# Patient Record
Sex: FEMALE | Race: WHITE | NOT HISPANIC OR LATINO | Employment: OTHER | URBAN - METROPOLITAN AREA
[De-identification: names, ages, dates, MRNs, and addresses within clinical notes are randomized per-mention and may not be internally consistent; named-entity substitution may affect disease eponyms.]

---

## 2021-10-21 LAB — NONINV COLON CA DNA+OCC BLD SCRN STL QL: NEGATIVE

## 2021-11-19 LAB — BCS RECOMMENDATION EXT: NORMAL

## 2021-11-28 ENCOUNTER — HISTORICAL (OUTPATIENT)
Dept: ADMINISTRATIVE | Facility: HOSPITAL | Age: 66
End: 2021-11-28

## 2021-11-28 LAB
INFLUENZA A ANTIGEN, POC: NEGATIVE
INFLUENZA B ANTIGEN, POC: NEGATIVE
SARS-COV-2 RNA RESP QL NAA+PROBE: NEGATIVE

## 2022-04-10 ENCOUNTER — HISTORICAL (OUTPATIENT)
Dept: ADMINISTRATIVE | Facility: HOSPITAL | Age: 67
End: 2022-04-10
Payer: MEDICARE

## 2022-04-25 VITALS
OXYGEN SATURATION: 99 % | HEIGHT: 63 IN | DIASTOLIC BLOOD PRESSURE: 60 MMHG | SYSTOLIC BLOOD PRESSURE: 80 MMHG | WEIGHT: 231.5 LBS | BODY MASS INDEX: 41.02 KG/M2

## 2022-06-20 ENCOUNTER — TELEPHONE (OUTPATIENT)
Dept: NEUROLOGY | Facility: CLINIC | Age: 67
End: 2022-06-20
Payer: MEDICARE

## 2022-06-20 NOTE — TELEPHONE ENCOUNTER
Patient called requesting a call back to further discuss if Dr. Napoles can prescribe her additional pills for Nurtec. States she only receives 8 pills a month and the prescription doesn't last at all. Reports she has headaches and need to know what to do when she is out of medication. Please advise.

## 2022-06-29 ENCOUNTER — OFFICE VISIT (OUTPATIENT)
Dept: INTERNAL MEDICINE | Facility: CLINIC | Age: 67
End: 2022-06-29
Payer: MEDICARE

## 2022-06-29 VITALS
DIASTOLIC BLOOD PRESSURE: 70 MMHG | OXYGEN SATURATION: 95 % | HEART RATE: 51 BPM | WEIGHT: 228 LBS | SYSTOLIC BLOOD PRESSURE: 126 MMHG | HEIGHT: 63 IN | BODY MASS INDEX: 40.4 KG/M2

## 2022-06-29 DIAGNOSIS — R10.9 CHRONIC ABDOMINAL PAIN: ICD-10-CM

## 2022-06-29 DIAGNOSIS — K21.9 GASTROESOPHAGEAL REFLUX DISEASE, UNSPECIFIED WHETHER ESOPHAGITIS PRESENT: ICD-10-CM

## 2022-06-29 DIAGNOSIS — N18.30 STAGE 3 CHRONIC KIDNEY DISEASE, UNSPECIFIED WHETHER STAGE 3A OR 3B CKD: ICD-10-CM

## 2022-06-29 DIAGNOSIS — I48.0 PAROXYSMAL ATRIAL FIBRILLATION: ICD-10-CM

## 2022-06-29 DIAGNOSIS — I50.9 CHRONIC CONGESTIVE HEART FAILURE, UNSPECIFIED HEART FAILURE TYPE: ICD-10-CM

## 2022-06-29 DIAGNOSIS — G89.29 CHRONIC ABDOMINAL PAIN: ICD-10-CM

## 2022-06-29 DIAGNOSIS — I10 ESSENTIAL (PRIMARY) HYPERTENSION: ICD-10-CM

## 2022-06-29 DIAGNOSIS — Z00.00 WELLNESS EXAMINATION: Primary | ICD-10-CM

## 2022-06-29 DIAGNOSIS — E78.5 HYPERLIPIDEMIA, UNSPECIFIED HYPERLIPIDEMIA TYPE: ICD-10-CM

## 2022-06-29 DIAGNOSIS — G43.909 MIGRAINE WITHOUT STATUS MIGRAINOSUS, NOT INTRACTABLE, UNSPECIFIED MIGRAINE TYPE: ICD-10-CM

## 2022-06-29 PROBLEM — N18.9 CHRONIC KIDNEY DISEASE: Status: ACTIVE | Noted: 2022-06-29

## 2022-06-29 PROCEDURE — 99204 PR OFFICE/OUTPT VISIT, NEW, LEVL IV, 45-59 MIN: ICD-10-PCS | Mod: ,,, | Performed by: STUDENT IN AN ORGANIZED HEALTH CARE EDUCATION/TRAINING PROGRAM

## 2022-06-29 PROCEDURE — 99204 OFFICE O/P NEW MOD 45 MIN: CPT | Mod: ,,, | Performed by: STUDENT IN AN ORGANIZED HEALTH CARE EDUCATION/TRAINING PROGRAM

## 2022-06-29 RX ORDER — LISINOPRIL 10 MG/1
10 TABLET ORAL DAILY
COMMUNITY
Start: 2022-05-30 | End: 2022-08-23

## 2022-06-29 RX ORDER — FUROSEMIDE 20 MG/1
20 TABLET ORAL DAILY PRN
COMMUNITY

## 2022-06-29 RX ORDER — FLUTICASONE PROPIONATE 50 MCG
SPRAY, SUSPENSION (ML) NASAL
COMMUNITY
Start: 2022-06-17

## 2022-06-29 RX ORDER — DOFETILIDE 0.12 MG/1
125 CAPSULE ORAL
COMMUNITY
Start: 2021-11-28

## 2022-06-29 RX ORDER — ERGOCALCIFEROL (VITAMIN D2) 200 MCG/ML
DROPS ORAL
COMMUNITY

## 2022-06-29 RX ORDER — DEXLANSOPRAZOLE 60 MG/1
60 CAPSULE, DELAYED RELEASE ORAL
COMMUNITY

## 2022-06-29 RX ORDER — PREGABALIN 150 MG/1
150 CAPSULE ORAL 2 TIMES DAILY
COMMUNITY
End: 2023-01-18 | Stop reason: SDUPTHER

## 2022-06-29 RX ORDER — POLYETHYLENE GLYCOL 3350 17 G/17G
17 POWDER, FOR SOLUTION ORAL DAILY PRN
COMMUNITY

## 2022-06-29 RX ORDER — CYCLOSPORINE 0.5 MG/ML
EMULSION OPHTHALMIC
COMMUNITY
Start: 2021-12-22

## 2022-06-29 RX ORDER — BISOPROLOL FUMARATE 5 MG/1
5 TABLET, FILM COATED ORAL DAILY
COMMUNITY
Start: 2022-06-07 | End: 2022-11-22 | Stop reason: SDUPTHER

## 2022-06-29 RX ORDER — CALCIUM CARBONATE 200(500)MG
1 TABLET,CHEWABLE ORAL DAILY PRN
COMMUNITY

## 2022-06-29 RX ORDER — TRAMADOL HYDROCHLORIDE 50 MG/1
50 TABLET ORAL 3 TIMES DAILY PRN
COMMUNITY
Start: 2022-06-14

## 2022-06-29 RX ORDER — FAMOTIDINE 40 MG/1
40 TABLET, FILM COATED ORAL DAILY
COMMUNITY
Start: 2022-06-23

## 2022-06-29 RX ORDER — DULOXETIN HYDROCHLORIDE 20 MG/1
20 CAPSULE, DELAYED RELEASE ORAL
COMMUNITY
Start: 2021-11-28

## 2022-06-29 RX ORDER — EZETIMIBE 10 MG/1
10 TABLET ORAL
COMMUNITY
Start: 2022-05-07 | End: 2022-08-08 | Stop reason: SDUPTHER

## 2022-06-29 RX ORDER — TOPIRAMATE 50 MG/1
TABLET, COATED ORAL
COMMUNITY
Start: 2022-06-22

## 2022-06-29 RX ORDER — APIXABAN 5 MG/1
5 TABLET, FILM COATED ORAL 2 TIMES DAILY
COMMUNITY
Start: 2022-06-08

## 2022-06-29 RX ORDER — DOXEPIN HYDROCHLORIDE 75 MG/1
75 CAPSULE ORAL
COMMUNITY

## 2022-06-29 NOTE — ASSESSMENT & PLAN NOTE
On ezetimibe 10 mg QD  Has had reaction to statin in the past  Will have labs with Cardiology, will send labs

## 2022-06-29 NOTE — ASSESSMENT & PLAN NOTE
Rate controlled, on Eliquis for anticoagulation  Has had pacemaker placed  Follows with Cardiology  Continue current medications

## 2022-06-29 NOTE — ASSESSMENT & PLAN NOTE
Mammogram: done at breast center [facility]  DXA scan: done at breast center  Colonoscopy: done with Dr Jernigan  Pneumonia vaccine: received with previous PCP

## 2022-06-29 NOTE — PROGRESS NOTES
Subjective:      Annalise Castelan  06/29/2022  [id number]    Lenora Womack MD  Patient Care Team:  Lenora Ambriz MD as PCP - General (Internal Medicine)          Visit Type:a scheduled routine follow-up visit    Chief Complaint: Establish Care    HPI   Ms Woody is a 65 y/o female patient who is here to establish care. Patient has past medical history significant for atrial fibrillation, hypertension, migraine headaches, GERD and gastroparesis. Patient is complaining today of chronic abdominal pain associated with gastroparesis. No other complaints.       Past Medical History:   Diagnosis Date    Diabetes mellitus type I     GERD (gastroesophageal reflux disease)     Hypothyroidism      Past Surgical History:   Procedure Laterality Date    2 c-sections      2 heart ablation      APPENDECTOMY      CHOLECYSTECTOMY      HYSTERECTOMY      right shoulder repair       No family history on file.  Social History     Tobacco Use    Smoking status: Never Smoker    Smokeless tobacco: Never Used   Substance and Sexual Activity    Alcohol use: Yes    Drug use: Never    Sexual activity: Not on file     Active Problem List with Overview Notes    Diagnosis Date Noted    Chronic kidney disease 06/29/2022    Gastroesophageal reflux disease 06/29/2022    Migraine 06/29/2022    Wellness examination 06/29/2022    Essential (primary) hypertension 01/17/2022    Hyperlipidemia 01/17/2022    Chronic congestive heart failure 08/02/2021    Chronic abdominal pain 08/02/2021    Paroxysmal atrial fibrillation 11/25/2018     Review of patient's allergies indicates:   Allergen Reactions    Nsaids (non-steroidal anti-inflammatory drug) Other (See Comments)     HEADACHES  HEADACHE    HEADACHE      Oxycodone hcl-oxycodone-asa Other (See Comments)     hallucinations    Pantoprazole Nausea Only and Other (See Comments)     HEADACHES  HEADACHES      Pseudoephedrine Nausea Only     Other reaction(s): Headache       Statins-hmg-coa reductase inhibitors Other (See Comments)     HEADACHE  HEADACHE    Other reaction(s): Headache       The following were reviewed at this visit: active problem list, medication list, allergies, family history, social history, and health maintenance.    Medications:    Current Outpatient Medications:     aluminum hydrox-magnesium carb  mg/15 mL Susp, Take 10 mLs by mouth every 6 (six) hours as needed., Disp: , Rfl:     bisoprolol (ZEBETA) 5 MG tablet, Take 5 mg by mouth once daily., Disp: , Rfl:     calcium carbonate (TUMS) 200 mg calcium (500 mg) chewable tablet, Take 1 tablet by mouth daily as needed., Disp: , Rfl:     cycloSPORINE (RESTASIS) 0.05 % ophthalmic emulsion, , Disp: , Rfl:     dexlansoprazole (DEXILANT) 60 mg capsule, Take 60 mg by mouth., Disp: , Rfl:     dofetilide (TIKOSYN) 125 MCG Cap, Take 125 mcg by mouth., Disp: , Rfl:     doxepin (SINEQUAN) 75 MG capsule, Take 75 mg by mouth., Disp: , Rfl:     DULoxetine (CYMBALTA) 20 MG capsule, Take 20 mg by mouth., Disp: , Rfl:     ELIQUIS 5 mg Tab, Take 5 mg by mouth 2 (two) times daily., Disp: , Rfl:     ergocalciferol 8000 units/mL Drop liquid (PEDS), Take by mouth., Disp: , Rfl:     ezetimibe (ZETIA) 10 mg tablet, Take 10 mg by mouth 3 (three) times a week., Disp: , Rfl:     famotidine (PEPCID) 40 MG tablet, Take 40 mg by mouth once daily., Disp: , Rfl:     fluticasone propionate (FLONASE) 50 mcg/actuation nasal spray, SMARTSI Both Nares Daily PRN, Disp: , Rfl:     furosemide (LASIX) 20 MG tablet, Take 20 mg by mouth daily as needed., Disp: , Rfl:     isomethepten-caf-acetaminophen 65- mg Tab, Take 1 tablet by mouth daily as needed., Disp: , Rfl:     lisinopriL 10 MG tablet, Take 10 mg by mouth once daily., Disp: , Rfl:     polyethylene glycol (GLYCOLAX) 17 gram/dose powder, Take 17 g by mouth daily as needed., Disp: , Rfl:     pregabalin (LYRICA) 150 MG capsule, Take 150 mg by mouth 2 (two) times a  "day., Disp: , Rfl:     rimegepant 75 mg odt, Take 1 tablet (75 mg total) by mouth every other day. Place ODT tablet on the tongue; alternatively the ODT tablet may be placed under the tongue, Disp: 15 tablet, Rfl: 2    TOPAMAX 50 mg tablet, Take by mouth., Disp: , Rfl:     traMADoL (ULTRAM) 50 mg tablet, Take 50 mg by mouth 4 (four) times daily as needed., Disp: , Rfl:       Medications have been reviewed and reconciled with patient at this visit.  Barriers to medications reviewed with patient.    Adverse reactions to current medications reviewed with patient..    Over the counter medications reviewed and reconciled with patient.  ROS        Objective:      Vitals:    06/29/22 1130   BP: 126/70   BP Location: Right arm   Pulse: (!) 51   SpO2: 95%   Weight: 103.4 kg (228 lb)   Height: 5' 3" (1.6 m)       Physical Exam      Laboratory Reviewed ({Yes)  No results found for: WBC, HGB, HCT, PLT, CHOL, TRIG, HDL, LDLDIRECT, ALT, AST, NA, K, CL, CREATININE, BUN, CO2, TSH, PSA, INR, GLUF, HGBA1C, MICROALBUR      Assessment and Plan:       Problem List Items Addressed This Visit        Neuro    Migraine     Follows with Neurology  Continue current medications               Cardiac/Vascular    Chronic congestive heart failure     Follows with Cardiology  Continue current medications              Essential (primary) hypertension     Controlled  Continue current medications           Hyperlipidemia     On ezetimibe 10 mg QD  Has had reaction to statin in the past  Will have labs with Cardiology, will send labs              Paroxysmal atrial fibrillation     Rate controlled, on Eliquis for anticoagulation  Has had pacemaker placed  Follows with Cardiology  Continue current medications                Renal/    Chronic kidney disease     Follows with Nephrology  Will send lab results from Cardiology office              GI    Gastroesophageal reflux disease     On dexlansoprazole  Continue current medication           Chronic " abdominal pain     Follows with GI  Possibly associated to gastroparesis                Other    Wellness examination - Primary     Mammogram: done at breast [facility]  DXA scan: done at breast [place]  Colonoscopy: done with Dr Jernigan  Pneumonia vaccine: received with previous PCP                   Care Plan/Goals: Reviewed    Goals    None         Follow up: Follow up in about 6 months (around 12/29/2022) for wellness.    No orders of the defined types were placed in this encounter.

## 2022-08-08 DIAGNOSIS — E78.5 HYPERLIPIDEMIA, UNSPECIFIED HYPERLIPIDEMIA TYPE: Primary | ICD-10-CM

## 2022-08-08 RX ORDER — EZETIMIBE 10 MG/1
10 TABLET ORAL DAILY
Qty: 90 TABLET | Refills: 3 | Status: SHIPPED | OUTPATIENT
Start: 2022-08-08 | End: 2023-07-17

## 2022-09-01 ENCOUNTER — TELEPHONE (OUTPATIENT)
Dept: INTERNAL MEDICINE | Facility: CLINIC | Age: 67
End: 2022-09-01
Payer: MEDICARE

## 2022-09-01 NOTE — TELEPHONE ENCOUNTER
Patient is stating her blood pressure is low 100/60 and is wondering if she should take  her blood pressure pill or not asking for you to contact her.

## 2022-09-13 ENCOUNTER — TELEPHONE (OUTPATIENT)
Dept: INTERNAL MEDICINE | Facility: CLINIC | Age: 67
End: 2022-09-13
Payer: MEDICARE

## 2022-09-13 NOTE — TELEPHONE ENCOUNTER
Patient called with concerns of elevated blood pressure, I spoke to Dr. Atkinson and notified patient if blood pressure went above 160 to go to a walk in clinic or ER until she comes in to appt. To be evaluated.

## 2022-09-28 ENCOUNTER — OFFICE VISIT (OUTPATIENT)
Dept: INTERNAL MEDICINE | Facility: CLINIC | Age: 67
End: 2022-09-28
Payer: MEDICARE

## 2022-09-28 VITALS
BODY MASS INDEX: 40.04 KG/M2 | RESPIRATION RATE: 18 BRPM | DIASTOLIC BLOOD PRESSURE: 70 MMHG | WEIGHT: 226 LBS | HEART RATE: 54 BPM | HEIGHT: 63 IN | OXYGEN SATURATION: 96 % | SYSTOLIC BLOOD PRESSURE: 128 MMHG

## 2022-09-28 DIAGNOSIS — I10 ESSENTIAL (PRIMARY) HYPERTENSION: ICD-10-CM

## 2022-09-28 PROCEDURE — 99214 OFFICE O/P EST MOD 30 MIN: CPT | Mod: ,,, | Performed by: STUDENT IN AN ORGANIZED HEALTH CARE EDUCATION/TRAINING PROGRAM

## 2022-09-28 PROCEDURE — G0008 ADMIN INFLUENZA VIRUS VAC: HCPCS | Mod: ,,, | Performed by: STUDENT IN AN ORGANIZED HEALTH CARE EDUCATION/TRAINING PROGRAM

## 2022-09-28 PROCEDURE — 90694 VACC AIIV4 NO PRSRV 0.5ML IM: CPT | Mod: ,,, | Performed by: STUDENT IN AN ORGANIZED HEALTH CARE EDUCATION/TRAINING PROGRAM

## 2022-09-28 PROCEDURE — G0008 FLU VACCINE - QUADRIVALENT - ADJUVANTED: ICD-10-PCS | Mod: ,,, | Performed by: STUDENT IN AN ORGANIZED HEALTH CARE EDUCATION/TRAINING PROGRAM

## 2022-09-28 PROCEDURE — 90694 FLU VACCINE - QUADRIVALENT - ADJUVANTED: ICD-10-PCS | Mod: ,,, | Performed by: STUDENT IN AN ORGANIZED HEALTH CARE EDUCATION/TRAINING PROGRAM

## 2022-09-28 PROCEDURE — 99214 PR OFFICE/OUTPT VISIT, EST, LEVL IV, 30-39 MIN: ICD-10-PCS | Mod: ,,, | Performed by: STUDENT IN AN ORGANIZED HEALTH CARE EDUCATION/TRAINING PROGRAM

## 2022-09-28 RX ORDER — ATOGEPANT 30 MG/1
1 TABLET ORAL DAILY PRN
COMMUNITY
Start: 2022-09-26

## 2022-09-28 NOTE — PROGRESS NOTES
Subjective:      Annalise Castelan  2022  [id number]      Chief Complaint: Follow-up and Hypertension    Follow-up  Pertinent negatives include no chest pain, chills, congestion, coughing, fever, headaches, myalgias, nausea, rash, sore throat, vomiting or weakness.   Hypertension  Pertinent negatives include no chest pain, headaches, malaise/fatigue, palpitations or shortness of breath.   Ms Woody presents due to noticing high blood pressure at home. Patient brings blood pressure log which shows BP of 140s systolic and 80s diastolic. Patient states she takes her medications daily. Blood pressure here in office taken several times is 120/80. No other complaints.     Past Medical History:   Diagnosis Date    Diabetes mellitus type I     GERD (gastroesophageal reflux disease)     Hypothyroidism      Past Surgical History:   Procedure Laterality Date    2 c-sections      2 heart ablation      APPENDECTOMY       SECTION   &     CHOLECYSTECTOMY      HYSTERECTOMY      right shoulder repair      TUBAL LIGATION       Family History   Problem Relation Age of Onset    Heart disease Father     Hypertension Father     Stroke Father     Hyperlipidemia Sister     Kidney disease Brother      Social History     Tobacco Use    Smoking status: Never    Smokeless tobacco: Never   Substance and Sexual Activity    Alcohol use: Yes    Drug use: Never    Sexual activity: Not Currently     Partners: Male     Birth control/protection: Abstinence     Review of patient's allergies indicates:   Allergen Reactions    Nsaids (non-steroidal anti-inflammatory drug) Other (See Comments)     HEADACHES  HEADACHE    HEADACHE      Oxycodone hcl-oxycodone-asa Other (See Comments)     hallucinations    Pantoprazole Nausea Only and Other (See Comments)     HEADACHES  HEADACHES      Pseudoephedrine Nausea Only     Other reaction(s): Headache      Statins-hmg-coa reductase inhibitors Other (See Comments)     HEADACHE  HEADACHE    Other  reaction(s): Headache       The following were reviewed at this visit: active problem list, medication list, allergies, family history, social history, and health maintenance.    Medications:    Current Outpatient Medications:     aluminum hydrox-magnesium carb  mg/15 mL Susp, Take 10 mLs by mouth every 6 (six) hours as needed., Disp: , Rfl:     bisoprolol (ZEBETA) 5 MG tablet, Take 5 mg by mouth once daily., Disp: , Rfl:     calcium carbonate (TUMS) 200 mg calcium (500 mg) chewable tablet, Take 1 tablet by mouth daily as needed., Disp: , Rfl:     cycloSPORINE (RESTASIS) 0.05 % ophthalmic emulsion, , Disp: , Rfl:     dexlansoprazole (DEXILANT) 60 mg capsule, Take 60 mg by mouth., Disp: , Rfl:     dofetilide (TIKOSYN) 125 MCG Cap, Take 125 mcg by mouth., Disp: , Rfl:     doxepin (SINEQUAN) 75 MG capsule, Take 75 mg by mouth., Disp: , Rfl:     DULoxetine (CYMBALTA) 20 MG capsule, Take 20 mg by mouth., Disp: , Rfl:     ELIQUIS 5 mg Tab, Take 5 mg by mouth 2 (two) times daily., Disp: , Rfl:     ergocalciferol 8000 units/mL Drop liquid (PEDS), Take by mouth., Disp: , Rfl:     ezetimibe (ZETIA) 10 mg tablet, Take 1 tablet (10 mg total) by mouth once daily., Disp: 90 tablet, Rfl: 3    famotidine (PEPCID) 40 MG tablet, Take 40 mg by mouth once daily., Disp: , Rfl:     fluticasone propionate (FLONASE) 50 mcg/actuation nasal spray, SMARTSI Both Nares Daily PRN, Disp: , Rfl:     furosemide (LASIX) 20 MG tablet, Take 20 mg by mouth daily as needed., Disp: , Rfl:     isomethepten-caf-acetaminophen 65- mg Tab, Take 1 tablet by mouth daily as needed., Disp: , Rfl:     lisinopriL 10 MG tablet, TAKE 1 TABLET BY MOUTH EVERY DAY, Disp: 90 tablet, Rfl: 1    polyethylene glycol (GLYCOLAX) 17 gram/dose powder, Take 17 g by mouth daily as needed., Disp: , Rfl:     pregabalin (LYRICA) 150 MG capsule, Take 150 mg by mouth 2 (two) times a day., Disp: , Rfl:     QULIPTA 30 mg Tab, Take 1 tablet by mouth daily as needed., Disp:  ", Rfl:     TOPAMAX 50 mg tablet, Take by mouth., Disp: , Rfl:     traMADoL (ULTRAM) 50 mg tablet, Take 50 mg by mouth 4 (four) times daily as needed., Disp: , Rfl:       Medications have been reviewed and reconciled with patient at this visit.  Barriers to medications reviewed with patient.    Adverse reactions to current medications reviewed with patient..    Over the counter medications reviewed and reconciled with patient.  Review of Systems   Constitutional:  Negative for chills, fever, malaise/fatigue and weight loss.   HENT:  Negative for congestion, ear discharge, ear pain, hearing loss, sinus pain and sore throat.    Eyes:  Negative for photophobia, pain, discharge and redness.   Respiratory:  Negative for cough, shortness of breath and wheezing.    Cardiovascular:  Negative for chest pain, palpitations and leg swelling.   Gastrointestinal:  Negative for constipation, diarrhea, heartburn, nausea and vomiting.   Genitourinary:  Negative for dysuria, frequency and urgency.   Musculoskeletal:  Negative for falls, joint pain and myalgias.   Skin:  Negative for itching and rash.   Neurological:  Negative for dizziness, focal weakness, weakness and headaches.   Psychiatric/Behavioral:  Negative for depression and memory loss. The patient is not nervous/anxious and does not have insomnia.          Objective:      Vitals:    09/28/22 0907   BP: 128/70   BP Location: Right arm   Pulse: (!) 54   Resp: 18   SpO2: 96%   Weight: 102.5 kg (226 lb)   Height: 5' 3" (1.6 m)       Physical Exam  Constitutional:       General: She is not in acute distress.     Appearance: Normal appearance.   HENT:      Head: Normocephalic and atraumatic.   Eyes:      Extraocular Movements: Extraocular movements intact.      Pupils: Pupils are equal, round, and reactive to light.   Cardiovascular:      Rate and Rhythm: Normal rate and regular rhythm.      Pulses: Normal pulses.      Heart sounds: Normal heart sounds. No murmur heard.    No " friction rub. No gallop.   Pulmonary:      Effort: Pulmonary effort is normal.      Breath sounds: Normal breath sounds. No wheezing, rhonchi or rales.   Abdominal:      General: Abdomen is flat. Bowel sounds are normal. There is no distension.      Palpations: Abdomen is soft.      Tenderness: There is no abdominal tenderness.   Musculoskeletal:         General: No swelling or tenderness. Normal range of motion.      Cervical back: Normal range of motion and neck supple. No tenderness.      Right lower leg: No edema.      Left lower leg: No edema.   Lymphadenopathy:      Cervical: No cervical adenopathy.   Skin:     Findings: No lesion or rash.   Neurological:      General: No focal deficit present.      Mental Status: She is alert and oriented to person, place, and time.      Cranial Nerves: No cranial nerve deficit.      Sensory: No sensory deficit.      Motor: No weakness.   Psychiatric:         Mood and Affect: Mood normal.         Behavior: Behavior normal.         Thought Content: Thought content normal.             Assessment and Plan:       1. Essential (primary) hypertension  Assessment & Plan:  Patient has obtained elevated readings at home however today it is controlled, 120/80, taken several times  Will continue current medications for now  Continue to monitor BP at home and report high BP readings  Follow up as scheduled in January       Other orders  -     Influenza (FLUAD) - Quadrivalent (Adjuvanted) *Preferred* (65+) (PF)          Follow up: Follow up as scheduled

## 2022-09-28 NOTE — ASSESSMENT & PLAN NOTE
Patient has obtained elevated readings at home however today it is controlled, 120/80, taken several times  Will continue current medications for now  Continue to monitor BP at home and report high BP readings  Follow up as scheduled in January

## 2022-10-03 ENCOUNTER — PATIENT MESSAGE (OUTPATIENT)
Dept: NEUROLOGY | Facility: CLINIC | Age: 67
End: 2022-10-03
Payer: MEDICARE

## 2022-10-03 ENCOUNTER — TELEPHONE (OUTPATIENT)
Dept: NEUROLOGY | Facility: CLINIC | Age: 67
End: 2022-10-03
Payer: MEDICARE

## 2022-10-03 ENCOUNTER — DOCUMENTATION ONLY (OUTPATIENT)
Dept: ADMINISTRATIVE | Facility: HOSPITAL | Age: 67
End: 2022-10-03
Payer: MEDICARE

## 2022-10-03 PROBLEM — Z00.00 WELLNESS EXAMINATION: Status: RESOLVED | Noted: 2022-06-29 | Resolved: 2022-10-03

## 2022-10-03 NOTE — TELEPHONE ENCOUNTER
Patient wants to know if you can fill her prescription for Caffeine Prochlor IND (CPI) 50/10/50 mg Capsule. Please advise [phone]

## 2022-10-03 NOTE — TELEPHONE ENCOUNTER
Wants to know if you can fill this medication for her. Caffeine Prochlor INDO (CPI) 50/10/50 mg. It is used for Migraines. If so want is sent to [facility]. Please advise [phone]

## 2022-10-05 ENCOUNTER — DOCUMENTATION ONLY (OUTPATIENT)
Dept: INTERNAL MEDICINE | Facility: CLINIC | Age: 67
End: 2022-10-05
Payer: MEDICARE

## 2022-10-05 DIAGNOSIS — G43.909 MIGRAINE WITHOUT STATUS MIGRAINOSUS, NOT INTRACTABLE, UNSPECIFIED MIGRAINE TYPE: Primary | ICD-10-CM

## 2022-11-22 DIAGNOSIS — I10 ESSENTIAL (PRIMARY) HYPERTENSION: Primary | ICD-10-CM

## 2022-11-22 RX ORDER — BISOPROLOL FUMARATE 5 MG/1
5 TABLET, FILM COATED ORAL DAILY
Qty: 90 TABLET | Refills: 3 | Status: SHIPPED | OUTPATIENT
Start: 2022-11-22 | End: 2023-10-19

## 2022-12-06 ENCOUNTER — TELEPHONE (OUTPATIENT)
Dept: INTERNAL MEDICINE | Facility: CLINIC | Age: 67
End: 2022-12-06
Payer: MEDICARE

## 2022-12-06 DIAGNOSIS — Z12.31 SCREENING MAMMOGRAM FOR BREAST CANCER: Primary | ICD-10-CM

## 2023-01-09 ENCOUNTER — OFFICE VISIT (OUTPATIENT)
Dept: INTERNAL MEDICINE | Facility: CLINIC | Age: 68
End: 2023-01-09
Payer: MEDICARE

## 2023-01-09 VITALS
TEMPERATURE: 98 F | RESPIRATION RATE: 16 BRPM | DIASTOLIC BLOOD PRESSURE: 74 MMHG | WEIGHT: 224 LBS | OXYGEN SATURATION: 96 % | HEART RATE: 72 BPM | BODY MASS INDEX: 39.69 KG/M2 | SYSTOLIC BLOOD PRESSURE: 132 MMHG | HEIGHT: 63 IN

## 2023-01-09 DIAGNOSIS — G43.909 MIGRAINE WITHOUT STATUS MIGRAINOSUS, NOT INTRACTABLE, UNSPECIFIED MIGRAINE TYPE: ICD-10-CM

## 2023-01-09 DIAGNOSIS — N18.30 STAGE 3 CHRONIC KIDNEY DISEASE, UNSPECIFIED WHETHER STAGE 3A OR 3B CKD: ICD-10-CM

## 2023-01-09 DIAGNOSIS — I10 ESSENTIAL (PRIMARY) HYPERTENSION: ICD-10-CM

## 2023-01-09 DIAGNOSIS — I48.0 PAROXYSMAL ATRIAL FIBRILLATION: ICD-10-CM

## 2023-01-09 DIAGNOSIS — E78.5 HYPERLIPIDEMIA, UNSPECIFIED HYPERLIPIDEMIA TYPE: ICD-10-CM

## 2023-01-09 DIAGNOSIS — Z00.00 MEDICARE ANNUAL WELLNESS VISIT, SUBSEQUENT: Primary | ICD-10-CM

## 2023-01-09 DIAGNOSIS — I50.9 CHRONIC CONGESTIVE HEART FAILURE, UNSPECIFIED HEART FAILURE TYPE: ICD-10-CM

## 2023-01-09 PROCEDURE — G0439 PR MEDICARE ANNUAL WELLNESS SUBSEQUENT VISIT: ICD-10-PCS | Mod: ,,, | Performed by: STUDENT IN AN ORGANIZED HEALTH CARE EDUCATION/TRAINING PROGRAM

## 2023-01-09 PROCEDURE — G0439 PPPS, SUBSEQ VISIT: HCPCS | Mod: ,,, | Performed by: STUDENT IN AN ORGANIZED HEALTH CARE EDUCATION/TRAINING PROGRAM

## 2023-01-09 NOTE — PROGRESS NOTES
Subjective:      Annalise Castelan  2023  [id number]    Lenora Womack MD  Patient Care Team:  Lenora Ambriz MD as PCP - General (Internal Medicine)          Visit Type:a scheduled routine follow-up visit    Chief Complaint: Medicare AW Follow Up, Gastroesophageal Reflux, Atrial Fibrillation, Chronic Kidney Disease, Hypertension, Hyperlipidemia, Migraine, and Congestive Heart Failure    Gastroesophageal Reflux  She reports no chest pain, no coughing, no heartburn, no nausea, no sore throat or no wheezing. Pertinent negatives include no weight loss.   Atrial Fibrillation  Symptoms are negative for chest pain, dizziness, palpitations, shortness of breath and weakness. Past medical history includes atrial fibrillation, CHF and hyperlipidemia.   Hypertension  Pertinent negatives include no chest pain, headaches, malaise/fatigue, palpitations or shortness of breath.   Hyperlipidemia  Pertinent negatives include no chest pain, focal weakness, myalgias or shortness of breath.   Migraine   Pertinent negatives include no coughing, dizziness, ear pain, eye pain, eye redness, fever, hearing loss, insomnia, nausea, photophobia, sore throat, vomiting, weakness or weight loss.   Congestive Heart Failure  Pertinent negatives include no chest pain, palpitations or shortness of breath.       MS Woody presents for annual medicare wellness. Only complaint today is R knee pain, she is seeing Orthopedics and is awaiting a MRI. No other complaints.       Past Medical History:   Diagnosis Date    Diabetes mellitus type I     GERD (gastroesophageal reflux disease)     Hypothyroidism      Past Surgical History:   Procedure Laterality Date    2 c-sections      2 heart ablation      APPENDECTOMY       SECTION   &     CHOLECYSTECTOMY      HYSTERECTOMY      right shoulder repair      TUBAL LIGATION       Family History   Problem Relation Age of Onset    Heart disease Father     Hypertension Father      Stroke Father     Hyperlipidemia Sister     Kidney disease Brother      Social History     Tobacco Use    Smoking status: Never    Smokeless tobacco: Never   Substance and Sexual Activity    Alcohol use: Yes    Drug use: Never    Sexual activity: Not Currently     Partners: Male     Birth control/protection: Abstinence     Active Problem List with Overview Notes    Diagnosis Date Noted    Chronic kidney disease 06/29/2022    Gastroesophageal reflux disease 06/29/2022    Migraine 06/29/2022    Medicare annual wellness visit, subsequent 06/29/2022    Essential (primary) hypertension 01/17/2022    Hyperlipidemia 01/17/2022    Chronic congestive heart failure 08/02/2021    Chronic abdominal pain 08/02/2021    Paroxysmal atrial fibrillation 11/25/2018     S/p pacemaker placement        Review of patient's allergies indicates:   Allergen Reactions    Nsaids (non-steroidal anti-inflammatory drug) Other (See Comments)     HEADACHES  HEADACHE    HEADACHE      Oxycodone hcl-oxycodone-asa Other (See Comments)     hallucinations    Pantoprazole Nausea Only and Other (See Comments)     HEADACHES  HEADACHES      Pseudoephedrine Nausea Only     Other reaction(s): Headache      Statins-hmg-coa reductase inhibitors Other (See Comments)     HEADACHE  HEADACHE    Other reaction(s): Headache       The following were reviewed at this visit: active problem list, medication list, allergies, family history, social history, and health maintenance.    Medications:    Current Outpatient Medications:     bisoprolol (ZEBETA) 5 MG tablet, Take 1 tablet (5 mg total) by mouth once daily., Disp: 90 tablet, Rfl: 3    calcium carbonate (TUMS) 200 mg calcium (500 mg) chewable tablet, Take 1 tablet by mouth daily as needed., Disp: , Rfl:     cycloSPORINE (RESTASIS) 0.05 % ophthalmic emulsion, , Disp: , Rfl:     dexlansoprazole (DEXILANT) 60 mg capsule, Take 60 mg by mouth., Disp: , Rfl:     dofetilide (TIKOSYN) 125 MCG Cap, Take 125 mcg by mouth.,  Disp: , Rfl:     doxepin (SINEQUAN) 75 MG capsule, Take 75 mg by mouth., Disp: , Rfl:     DULoxetine (CYMBALTA) 20 MG capsule, Take 20 mg by mouth., Disp: , Rfl:     ELIQUIS 5 mg Tab, Take 5 mg by mouth 2 (two) times daily., Disp: , Rfl:     ergocalciferol 8000 units/mL Drop liquid (PEDS), Take by mouth., Disp: , Rfl:     ezetimibe (ZETIA) 10 mg tablet, Take 1 tablet (10 mg total) by mouth once daily., Disp: 90 tablet, Rfl: 3    famotidine (PEPCID) 40 MG tablet, Take 40 mg by mouth once daily., Disp: , Rfl:     fluticasone propionate (FLONASE) 50 mcg/actuation nasal spray, SMARTSI Both Nares Daily PRN, Disp: , Rfl:     furosemide (LASIX) 20 MG tablet, Take 20 mg by mouth daily as needed., Disp: , Rfl:     isomethepten-caf-acetaminophen 65- mg Tab, Take 1 tablet by mouth daily as needed., Disp: , Rfl:     lisinopriL 10 MG tablet, TAKE 1 TABLET BY MOUTH EVERY DAY, Disp: 90 tablet, Rfl: 1    NURTEC 75 mg odt, TAKE 1 TABLET BY MOUTH EVERY OTHER DAY (ALLOW TABLET TO DISSOLVE ON TONGUE), Disp: 8 tablet, Rfl: 11    polyethylene glycol (GLYCOLAX) 17 gram/dose powder, Take 17 g by mouth daily as needed., Disp: , Rfl:     pregabalin (LYRICA) 150 MG capsule, Take 150 mg by mouth 2 (two) times a day., Disp: , Rfl:     QULIPTA 30 mg Tab, Take 1 tablet by mouth daily as needed., Disp: , Rfl:     TOPAMAX 50 mg tablet, Take by mouth. 50 a qm  100 q hs, Disp: , Rfl:     traMADoL (ULTRAM) 50 mg tablet, Take 50 mg by mouth 3 (three) times daily as needed., Disp: , Rfl:     aluminum hydrox-magnesium carb  mg/15 mL Susp, Take 10 mLs by mouth every 6 (six) hours as needed., Disp: , Rfl:       Medications have been reviewed and reconciled with patient at this visit.  Barriers to medications reviewed with patient.    Adverse reactions to current medications reviewed with patient..    Over the counter medications reviewed and reconciled with patient.    Opioid Screening: Patient medication list reviewed, patient is not taking  prescription opioids. Patient is not using additional opioids than prescribed. Patient is at low risk of substance abuse based on this opioid use history.     Review of Systems   Constitutional:  Negative for chills, fever, malaise/fatigue and weight loss.   HENT:  Negative for congestion, ear discharge, ear pain, hearing loss, sinus pain and sore throat.    Eyes:  Negative for photophobia, pain, discharge and redness.   Respiratory:  Negative for cough, shortness of breath and wheezing.    Cardiovascular:  Negative for chest pain, palpitations and leg swelling.   Gastrointestinal:  Negative for constipation, diarrhea, heartburn, nausea and vomiting.   Genitourinary:  Negative for dysuria, frequency and urgency.   Musculoskeletal:  Negative for falls, joint pain and myalgias.   Skin:  Negative for itching and rash.   Neurological:  Negative for dizziness, focal weakness, weakness and headaches.   Psychiatric/Behavioral:  Negative for depression and memory loss. The patient is not nervous/anxious and does not have insomnia.      What [age] 65-69  Are you male or female?: Female  During the past four weeks, how much have you been bothered by emotional problems such as feeling anxious, depressed, irritable, sad, or downhearted and blue?: Not at all  During the past five weeks, has your physical and/or emotional health limited your social activities with family, friends, neighbors, or groups?: Not at all  During the past four weeks, how much bodily pain have you generally had?: Severe pain  During the past four weeks, was someone available to help if you needed and wanted help?: Yes, as much as I wanted  During the past four weeks, what was the hardest physical activity you could do for at least two minutes?: Very light  Can you get to places out of walking distance without help?  (For example, can you travel alone on buses or taxis, or drive your own car?): Yes  Can you go shopping for groceries or clothes  without someone's help?: Yes  Can you prepare your own meals?: Yes  Can you do your own housework without help?: Yes  Because of any health problems, do you need the help of another person with your personal care needs such as eating, bathing, dressing, or getting around the house?: No  Can you handle your own money without help?: Yes  During the past four weeks, how would you rate your health in general?: Fair  How have things been going for you during the past four weeks?: Good and bad parts about equal  Are you having difficulties driving your car?: No  Do you always fasten your seat belt when you are in a car?: Yes, usually  How often in the past four weeks have you been bothered by falling or dizzy when standing up?: Sometimes  How often in the past four weeks have you been bothered by sexual problems?: Never  How often in the past four weeks have you been bothered by trouble eating well?: Never  How often in the past four weeks have you been bothered by teeth or denture problems?: Never  How often in the past four weeks have you been bothered with problems using the telephone?: Never  How often in the past four weeks have you been bothered by tiredness or fatigue?: Always  Have you fallen two or more times in the past year?: No  Are you afraid of falling?: No  Are you a smoker?: No  During the past four weeks, how many drinks of wine, beer, or other alcoholic beverages did you have?: No alcohol at all  Do you exercise for about 20 minutes three or more days a week?: No, I usually do not exercise this much  Have you been given any information to help you with hazards in your house that might hurt you?: No  Have you been given any information to help you with keeping track of your medications?: No  How often do you have trouble taking medicines the way you've been told to take them?: I always take them as prescribed  How confident are you that you can control and manage most of your health problems?: Somewhat  "confident  What is your race? (Check all that apply.):           Objective:      Vitals:    01/09/23 0957   BP: 132/74   Pulse: 72   Resp: 16   Temp: 97.8 °F (36.6 °C)   SpO2: 96%   Weight: 101.6 kg (224 lb)   Height: 5' 3" (1.6 m)       Physical Exam  Constitutional:       General: She is not in acute distress.     Appearance: Normal appearance.   HENT:      Head: Normocephalic and atraumatic.   Eyes:      Extraocular Movements: Extraocular movements intact.      Pupils: Pupils are equal, round, and reactive to light.   Cardiovascular:      Rate and Rhythm: Normal rate and regular rhythm.      Pulses: Normal pulses.      Heart sounds: Normal heart sounds. No murmur heard.    No friction rub. No gallop.   Pulmonary:      Effort: Pulmonary effort is normal.      Breath sounds: Normal breath sounds. No wheezing, rhonchi or rales.   Abdominal:      General: Abdomen is flat. Bowel sounds are normal. There is no distension.      Palpations: Abdomen is soft.      Tenderness: There is no abdominal tenderness.   Musculoskeletal:         General: No swelling or tenderness. Normal range of motion.      Cervical back: Normal range of motion and neck supple. No tenderness.      Right lower leg: No edema.      Left lower leg: No edema.   Lymphadenopathy:      Cervical: No cervical adenopathy.   Skin:     Findings: No lesion or rash.   Neurological:      General: No focal deficit present.      Mental Status: She is alert and oriented to person, place, and time.      Cranial Nerves: No cranial nerve deficit.      Sensory: No sensory deficit.      Motor: No weakness.   Psychiatric:         Mood and Affect: Mood normal.         Behavior: Behavior normal.         Thought Content: Thought content normal.       No flowsheet data found.  Fall Risk Assessment - Outpatient 1/9/2023 9/28/2022 6/29/2022   Mobility Status Ambulatory Ambulatory Ambulatory   Number of falls 0 0 0   Identified as fall risk 0 0 0             "     Depression Screening  Over the past two weeks, has the patient felt down, depressed, or hopeless?: No  Over the past two weeks, has the patient felt little interest or pleasure in doing things?: No  Functional Ability/Safety Screening  Was the patient's timed Up & Go test unsteady or longer than 30 seconds?: No  Does the patient need help with phone, transportation, shopping, preparing meals, housework, laundry, meds, or managing money?: No  Does the patient's home have rugs in the hallway, lack grab bars in the bathroom, lack handrails on the stairs or have poor lighting?: No  Have you noticed any hearing difficulties?: No  Cognitive Function (Assessed through direct observation with due consideration of information obtained by way of patient reports and/or concerns raised by family, friends, caretakers, or others)    Does the patient repeat questions/statements in the same day?: No  Does the patient have trouble remembering the date, year, and time?: No  Does the patient have difficulty managing finances?: No  Does the patient have a decreased sense of direction?: No        Laboratory Reviewed ({Yes)  No results found for: WBC, HGB, HCT, PLT, CHOL, TRIG, HDL, LDLDIRECT, ALT, AST, NA, K, CL, CREATININE, BUN, CO2, TSH, PSA, INR, GLUF, HGBA1C, MICROALBUR      Assessment and Plan:       Problem List Items Addressed This Visit          Neuro    Migraine     Stable on current medications  Follows with Neurology             Cardiac/Vascular    Chronic congestive heart failure     Stable  Continue current medications  Follows with Cardiology            Essential (primary) hypertension     Controlled  Continue current medications          Hyperlipidemia     On ezetimibe  Has had reaction to statins in the past   Continue current medication          Paroxysmal atrial fibrillation     Controlled  Continue current medications             Renal/    Chronic kidney disease     Recent labs with Cardiology  Will request lab  results             Other    Medicare annual wellness visit, subsequent - Primary     Mammogram: scheduled with Breast center [facility] at the end of the month  Colonoscopy: up to date  DXA scan: up to date                  Care Plan/Goals: Reviewed    Goals    None         Follow up: Follow up in about 6 months (around 7/9/2023) for Bp follow up.    No orders of the defined types were placed in this encounter.      Medicare Annual Wellness and Personalized Prevention Plan:   Fall Risk + Home Safety + Hearing Impairment + Depression Screen + Cognitive Impairment Screen + Health Risk Assessment all reviewed.     Health Maintenance Topics with due status: Not Due       Topic Last Completion Date    Colorectal Cancer Screening 10/11/2021    Lipid Panel 01/05/2022      The patient's Health Maintenance was reviewed and the following appears to be due at this time:   Health Maintenance Due   Topic Date Due    Hepatitis C Screening  Never done    TETANUS VACCINE  Never done    Hemoglobin A1c (Diabetic Prevention Screening)  Never done    DEXA Scan  Never done    Shingles Vaccine (1 of 2) Never done    Pneumococcal Vaccines (Age 65+) (2 - PCV) 12/16/2017    COVID-19 Vaccine (4 - Booster for Moderna series) 03/10/2022    Mammogram  11/19/2022       Advance Care Planning   I attest to discussing Advance Care Planning with patient and/or family member.  Education was provided including the importance of the Health Care Power of , Advance Directives, and/or [place] documentation.  The patient expressed understanding to the importance of this information and discussion.

## 2023-01-09 NOTE — ASSESSMENT & PLAN NOTE
Mammogram: scheduled with Breast center [facility] at the end of the month  Colonoscopy: up to date  DXA scan: up to date

## 2023-01-17 ENCOUNTER — TELEPHONE (OUTPATIENT)
Dept: INTERNAL MEDICINE | Facility: CLINIC | Age: 68
End: 2023-01-17
Payer: MEDICARE

## 2023-01-17 NOTE — TELEPHONE ENCOUNTER
----- Message from Lise Bertrand sent at 2023  1:44 PM CST -----  Regarding: REFILL  MEDICATION REFILL REQUEST      PATIENT PHONE #: [phone]     :  1955     PHARMACY: [facility] [address]          MESSAGE  NEEDS HER PREGABLIN REFILLED .............the patient SAID THAT DR. WAN HAD     ADVISED PT TO TAKE 3 TIMES A DAY FOR HER KNEE PROBLEMS INSTEAD OF TWICE A     DAY.  BY DOING SO , SHE IS ALMOST OUT OF MEDICINE.     SHE WOULD NEED THE NEW SCRIPT TO PRESCRIBE TO TAKE 3 TIMES A DAY      PLEASE REFILL:       pregabalin (LYRICA) 150 MG capsule

## 2023-01-18 RX ORDER — PREGABALIN 150 MG/1
150 CAPSULE ORAL 3 TIMES DAILY
Qty: 90 CAPSULE | Refills: 0 | Status: SHIPPED | OUTPATIENT
Start: 2023-01-18

## 2023-01-18 NOTE — TELEPHONE ENCOUNTER
Patients stated you increased her medication to 3 times daily, needs a new script with increase of Lyrica 150 mg 3 times daily.

## 2023-02-25 ENCOUNTER — OFFICE VISIT (OUTPATIENT)
Dept: URGENT CARE | Facility: CLINIC | Age: 68
End: 2023-02-25
Payer: MEDICARE

## 2023-02-25 VITALS
SYSTOLIC BLOOD PRESSURE: 125 MMHG | WEIGHT: 224 LBS | HEIGHT: 63 IN | HEART RATE: 61 BPM | BODY MASS INDEX: 39.69 KG/M2 | OXYGEN SATURATION: 98 % | RESPIRATION RATE: 18 BRPM | TEMPERATURE: 98 F | DIASTOLIC BLOOD PRESSURE: 74 MMHG

## 2023-02-25 DIAGNOSIS — J30.9 ALLERGIC RHINITIS, UNSPECIFIED SEASONALITY, UNSPECIFIED TRIGGER: Primary | ICD-10-CM

## 2023-02-25 PROCEDURE — 99213 OFFICE O/P EST LOW 20 MIN: CPT | Mod: ,,, | Performed by: FAMILY MEDICINE

## 2023-02-25 PROCEDURE — 99213 PR OFFICE/OUTPT VISIT, EST, LEVL III, 20-29 MIN: ICD-10-PCS | Mod: ,,, | Performed by: FAMILY MEDICINE

## 2023-02-25 RX ORDER — MONTELUKAST SODIUM 10 MG/1
10 TABLET ORAL NIGHTLY
Qty: 30 TABLET | Refills: 0 | Status: SHIPPED | OUTPATIENT
Start: 2023-02-25 | End: 2023-03-23 | Stop reason: SDUPTHER

## 2023-02-25 RX ORDER — ALBUTEROL SULFATE 90 UG/1
2 AEROSOL, METERED RESPIRATORY (INHALATION) EVERY 6 HOURS PRN
Qty: 6.7 G | Refills: 0 | Status: SHIPPED | OUTPATIENT
Start: 2023-02-25 | End: 2023-03-11

## 2023-02-25 NOTE — PROGRESS NOTES
"Subjective:       Patient ID: Annalise Castelan is a 67 y.o. female.    Vitals:  height is 5' 2.99" (1.6 m) and weight is 101.6 kg (224 lb). Her oral temperature is 98.2 °F (36.8 °C). Her blood pressure is 125/74 and her pulse is 61. Her respiration is 18 and oxygen saturation is 98%.     Chief Complaint: Sore Throat, Cough (67 y.o. female presents to clinic c/o nonproductive cough, nasal congestion x 2 weeks. Pt has taken amoxicillin(old prescription-has been taking for 3 days), allegra and claritin with no relief. Pt states abx has given her an upset stomach and diarrhea x today and would like something to treat those symptoms. ), Nasal Congestion, Diarrhea, and Abdominal Pain    67 y.o. female presents to clinic c/o nonproductive cough, nasal congestion x 2 weeks. Pt has taken amoxicillin(old prescription-has been taking for 3 days), allegra and claritin with no relief. Pt states abx has given her an upset stomach and diarrhea x today and would like something to treat those symptoms.     Sore Throat   This is a new problem. The current episode started 1 to 4 weeks ago. The problem has been gradually improving. There has been no fever. The pain is mild. Associated symptoms include coughing. Pertinent negatives include no congestion, neck pain, shortness of breath or trouble swallowing. Treatments tried: claritin, allegra.   Cough  This is a new problem. The current episode started 1 to 4 weeks ago. The problem has been unchanged. The problem occurs every few minutes. The cough is Non-productive. Associated symptoms include postnasal drip and a sore throat. Pertinent negatives include no chest pain, chills, fever, shortness of breath or wheezing. Nothing aggravates the symptoms.     Constitution: Negative for chills, fatigue and fever.   HENT:  Positive for postnasal drip and sore throat. Negative for congestion, sinus pressure and trouble swallowing.    Neck: Negative for neck pain and neck stiffness. "   Cardiovascular:  Negative for chest pain, leg swelling and sob on exertion.   Respiratory:  Positive for cough. Negative for chest tightness, shortness of breath and wheezing.    Neurological:  Negative for dizziness, disorientation and altered mental status.   Psychiatric/Behavioral:  Negative for altered mental status and disorientation.      Objective:      Physical Exam   Constitutional: She is oriented to person, place, and time. She appears well-developed. No distress.   HENT:   Head: Normocephalic.   Ears:   Right Ear: Tympanic membrane and external ear normal.   Left Ear: Tympanic membrane and external ear normal.   Nose: Rhinorrhea present.   Mouth/Throat: Uvula is midline and mucous membranes are normal. No uvula swelling. Cobblestoning present. No oropharyngeal exudate or posterior oropharyngeal edema. Tonsils are 0 on the right. Tonsils are 0 on the left. No tonsillar exudate.   Eyes: Pupils are equal, round, and reactive to light. Right eye exhibits no discharge. Left eye exhibits no discharge.   Neck: Neck supple. No tracheal deviation present.   Cardiovascular: Normal rate, regular rhythm and normal heart sounds.   No murmur heard.  Pulmonary/Chest: Effort normal and breath sounds normal. No stridor. No respiratory distress. She has no wheezes.   Lymphadenopathy:     She has no cervical adenopathy.   Neurological: no focal deficit. She is alert and oriented to person, place, and time.   Skin: Skin is warm and dry.   Psychiatric: Mood and thought content normal.   Nursing note and vitals reviewed.      Assessment:       1. Allergic rhinitis, unspecified seasonality, unspecified trigger          Plan:         Allergic rhinitis, unspecified seasonality, unspecified trigger  -     albuterol (PROVENTIL HFA) 90 mcg/actuation inhaler; Inhale 2 puffs into the lungs every 6 (six) hours as needed for Wheezing. Rescue  Dispense: 6.7 g; Refill: 0  -     montelukast (SINGULAIR) 10 mg tablet; Take 1 tablet (10 mg  total) by mouth every evening.  Dispense: 30 tablet; Refill: 0

## 2023-02-25 NOTE — PATIENT INSTRUCTIONS
Flonase and saline nasal spray twice a day, antihistamine at bedtime.  Use albuterol inhaler or nebulizer two puffs every 4-6 hours as needed for wheeze. Force fluids.  Cough may linger a few weeks but should not have fever, chest pain, or shortness of breath.

## 2023-03-02 ENCOUNTER — TELEPHONE (OUTPATIENT)
Dept: INTERNAL MEDICINE | Facility: CLINIC | Age: 68
End: 2023-03-02
Payer: MEDICARE

## 2023-03-23 DIAGNOSIS — J30.9 ALLERGIC RHINITIS, UNSPECIFIED SEASONALITY, UNSPECIFIED TRIGGER: ICD-10-CM

## 2023-03-23 RX ORDER — MONTELUKAST SODIUM 10 MG/1
10 TABLET ORAL NIGHTLY
Qty: 90 TABLET | Refills: 0 | Status: SHIPPED | OUTPATIENT
Start: 2023-03-23 | End: 2023-04-27

## 2023-04-10 PROBLEM — Z00.00 MEDICARE ANNUAL WELLNESS VISIT, SUBSEQUENT: Status: RESOLVED | Noted: 2022-06-29 | Resolved: 2023-04-10

## 2023-04-27 DIAGNOSIS — J30.9 ALLERGIC RHINITIS, UNSPECIFIED SEASONALITY, UNSPECIFIED TRIGGER: ICD-10-CM

## 2023-04-27 RX ORDER — MONTELUKAST SODIUM 10 MG/1
TABLET ORAL
Qty: 90 TABLET | Refills: 0 | Status: SHIPPED | OUTPATIENT
Start: 2023-04-27 | End: 2023-07-06 | Stop reason: SDUPTHER

## 2023-05-11 ENCOUNTER — OFFICE VISIT (OUTPATIENT)
Dept: URGENT CARE | Facility: CLINIC | Age: 68
End: 2023-05-11
Payer: MEDICARE

## 2023-05-11 VITALS
DIASTOLIC BLOOD PRESSURE: 72 MMHG | WEIGHT: 222 LBS | OXYGEN SATURATION: 99 % | TEMPERATURE: 99 F | SYSTOLIC BLOOD PRESSURE: 114 MMHG | BODY MASS INDEX: 39.34 KG/M2 | HEIGHT: 63 IN | RESPIRATION RATE: 18 BRPM | HEART RATE: 60 BPM

## 2023-05-11 DIAGNOSIS — N30.01 ACUTE CYSTITIS WITH HEMATURIA: Primary | ICD-10-CM

## 2023-05-11 DIAGNOSIS — R30.0 DYSURIA: ICD-10-CM

## 2023-05-11 DIAGNOSIS — R81 GLUCOSE FOUND IN URINE ON EXAMINATION: ICD-10-CM

## 2023-05-11 LAB
BILIRUB UR QL STRIP: NEGATIVE
GLUCOSE SERPL-MCNC: 96 MG/DL (ref 70–110)
GLUCOSE UR QL STRIP: POSITIVE
KETONES UR QL STRIP: NEGATIVE
LEUKOCYTE ESTERASE UR QL STRIP: POSITIVE
PH, POC UA: 6
POC BLOOD, URINE: POSITIVE
POC NITRATES, URINE: NEGATIVE
PROT UR QL STRIP: POSITIVE
SP GR UR STRIP: 1.01 (ref 1–1.03)
UROBILINOGEN UR STRIP-ACNC: ABNORMAL (ref 0.1–1.1)

## 2023-05-11 PROCEDURE — 99214 PR OFFICE/OUTPT VISIT, EST, LEVL IV, 30-39 MIN: ICD-10-PCS | Mod: ,,, | Performed by: FAMILY MEDICINE

## 2023-05-11 PROCEDURE — 99214 OFFICE O/P EST MOD 30 MIN: CPT | Mod: ,,, | Performed by: FAMILY MEDICINE

## 2023-05-11 PROCEDURE — 81003 POCT URINALYSIS, DIPSTICK, MANUAL, W/O SCOPE: ICD-10-PCS | Mod: QW,,, | Performed by: FAMILY MEDICINE

## 2023-05-11 PROCEDURE — 82962 GLUCOSE BLOOD TEST: CPT | Mod: ,,, | Performed by: FAMILY MEDICINE

## 2023-05-11 PROCEDURE — 87077 CULTURE AEROBIC IDENTIFY: CPT | Performed by: FAMILY MEDICINE

## 2023-05-11 PROCEDURE — 81003 URINALYSIS AUTO W/O SCOPE: CPT | Mod: QW,,, | Performed by: FAMILY MEDICINE

## 2023-05-11 PROCEDURE — 87088 URINE BACTERIA CULTURE: CPT | Performed by: FAMILY MEDICINE

## 2023-05-11 PROCEDURE — 82962 POCT GLUCOSE, HAND-HELD DEVICE: ICD-10-PCS | Mod: ,,, | Performed by: FAMILY MEDICINE

## 2023-05-11 RX ORDER — NITROFURANTOIN 25; 75 MG/1; MG/1
100 CAPSULE ORAL 2 TIMES DAILY
Qty: 14 CAPSULE | Refills: 0 | Status: SHIPPED | OUTPATIENT
Start: 2023-05-11 | End: 2023-05-18

## 2023-05-11 NOTE — PROGRESS NOTES
"Subjective:      Patient ID: Annalise Castelan is a 67 y.o. female.    Vitals:  height is 5' 2.5" (1.588 m) and weight is 100.7 kg (222 lb). Her temperature is 98.6 °F (37 °C). Her blood pressure is 114/72 and her pulse is 60. Her respiration is 18 and oxygen saturation is 99%.     Chief Complaint: Dysuria (Painful (burning) and frequent urination x 1 day. )    1 days of dysuria.  No fever or flank pain.  Last UTI was over 3 months ago.  Labs from last month glucose was WNL.        Constitution: Negative for chills, sweating and fever.   HENT:  Negative for sinus pressure.    Respiratory:  Negative for cough.    Genitourinary:  Positive for frequency and urgency. Negative for flank pain, vaginal discharge and pelvic pain.   Skin:  Negative for rash.   Neurological:  Negative for loss of consciousness.    Objective:     Physical Exam   Constitutional: She is oriented to person, place, and time. She appears well-developed.   HENT:   Head: Normocephalic and atraumatic.   Mouth/Throat: Mucous membranes are normal. Mucous membranes are moist.   Eyes: Lids are normal.   Neck: Trachea normal. Neck supple.   Cardiovascular: Normal rate, regular rhythm and normal heart sounds.   Pulmonary/Chest: Effort normal and breath sounds normal. No respiratory distress.   Abdominal: Normal appearance. She exhibits no abdominal bruit, no pulsatile midline mass and no mass. Soft. There is no abdominal tenderness.   Musculoskeletal: Normal range of motion.         General: Normal range of motion.   Neurological: no focal deficit. She is alert and oriented to person, place, and time. She has normal strength.   Skin: Skin is warm, dry, intact, not diaphoretic and not pale.   Psychiatric: Her speech is normal and behavior is normal. Mood, judgment and thought content normal.   Nursing note and vitals reviewed.    Assessment:     1. Acute cystitis with hematuria    2. Dysuria    3. Glucose found in urine on examination        Plan:       Acute " cystitis with hematuria  -     Urine culture  -     nitrofurantoin, macrocrystal-monohydrate, (MACROBID) 100 MG capsule; Take 1 capsule (100 mg total) by mouth 2 (two) times daily. for 7 days  Dispense: 14 capsule; Refill: 0    Dysuria  -     POCT Urinalysis, Dipstick, Manual, w/o Scope    Glucose found in urine on examination           Urine dip with blood, glucose, leukocytes.   Glucose not elevated on POC.      Component Ref Range & Units 14:20   POC Blood, Urine Negative Positive Abnormal     Comment: 250   POC Bilirubin, Urine Negative Negative    POC Urobilinogen, Urine 0.1 - 1.1 norm    POC Ketones, Urine Negative Negative    POC Protein, Urine Negative Positive Abnormal     Comment: 30   POC Nitrates, Urine Negative Negative    POC Glucose, Urine Negative Positive Abnormal     Comment: 150   pH, UA  6    POC Specific Gravity, Urine 1.003 - 1.029 1.010    POC Leukocytes, Urine Negative Positive Abnormal     Comment: 500   Resulting Agency  [facility]

## 2023-05-11 NOTE — LETTER
"                     [facility] FAX TRANSMISSION  Fax Cover Sheet        DATE: 2023    TO: Office of Dr. Darryl Law    FAX: [phone]    ATTN: Nurse/Staff    FROM: [facility] [place]                Phone: [phone]                Fax: [phone]    BY: Mannie    COMMENTS: [place] patient. Recent Urgent Care OV Notes of Annalise Castelan  1955            CONFIDENTIALITY NOTICE This facsimile transmission and any documents accompanying are intended only for the use of the individual or entity names above on this transmission sheet and may contain information from [facility], which is confidential, privileged, and exempt from disclosure under applicable law. If you are not the intended recipient, you are notified that any disclosure, copying, distributing, or use of the contents of this facsimile is strictly prohibited. If you have received this transmission in error, please notify us.                     Annalise Castelan  2023 2:10 PM   Office Visit  MRN:  [id number]  Description: Female  : 1955 Provider: Elyse Zuleta MD Department: [facility] URGENT CARE     Reason for Visit    Dysuria Painful (burning) and frequent urination x 1 day.    Reason for Visit History        Progress Notes    Elyse Zuleta MD at 2023  2:10 PM    Status: Signed   Subjective:      Patient ID: Annalise Castelan is a 67 y.o. female.     Vitals:  height is 5' 2.5" (1.588 m) and weight is 100.7 kg (222 lb). Her temperature is 98.6 °F (37 °C). Her blood pressure is 114/72 and her pulse is 60. Her respiration is 18 and oxygen saturation is 99%.      Chief Complaint: Dysuria (Painful (burning) and frequent urination x 1 day. )     1 days of dysuria.  No fever or flank pain.  Last UTI was over 3 months ago.  Labs from last month glucose was WNL.          Constitution: Negative for chills, sweating and fever.   HENT:  Negative for sinus pressure.  "   Respiratory:  Negative for cough.    Genitourinary:  Positive for frequency and urgency. Negative for flank pain, vaginal discharge and pelvic pain.   Skin:  Negative for rash.   Neurological:  Negative for loss of consciousness.    Objective:      Physical Exam   Constitutional: She is oriented to person, place, and time. She appears well-developed.   HENT:   Head: Normocephalic and atraumatic.   Mouth/Throat: Mucous membranes are normal. Mucous membranes are moist.   Eyes: Lids are normal.   Neck: Trachea normal. Neck supple.   Cardiovascular: Normal rate, regular rhythm and normal heart sounds.   Pulmonary/Chest: Effort normal and breath sounds normal. No respiratory distress.   Abdominal: Normal appearance. She exhibits no abdominal bruit, no pulsatile midline mass and no mass. Soft. There is no abdominal tenderness.   Musculoskeletal: Normal range of motion.         General: Normal range of motion.   Neurological: no focal deficit. She is alert and oriented to person, place, and time. She has normal strength.   Skin: Skin is warm, dry, intact, not diaphoretic and not pale.   Psychiatric: Her speech is normal and behavior is normal. Mood, judgment and thought content normal.   Nursing note and vitals reviewed.     Assessment:      1. Acute cystitis with hematuria    2. Dysuria    3. Glucose found in urine on examination          Plan:         Acute cystitis with hematuria  -     Urine culture  -     nitrofurantoin, macrocrystal-monohydrate, (MACROBID) 100 MG capsule; Take 1 capsule (100 mg total) by mouth 2 (two) times daily. for 7 days  Dispense: 14 capsule; Refill: 0     Dysuria  -     POCT Urinalysis, Dipstick, Manual, w/o Scope     Glucose found in urine on examination               Urine dip with blood, glucose, leukocytes.   Glucose not elevated on POC.      Component Ref Range & Units 14:20   POC Blood, Urine Negative Positive Abnormal     Comment: 250   POC Bilirubin, Urine Negative Negative    POC  "Urobilinogen, Urine 0.1 - 1.1 norm    POC Ketones, Urine Negative Negative    POC Protein, Urine Negative Positive Abnormal     Comment: 30   POC Nitrates, Urine Negative Negative    POC Glucose, Urine Negative Positive Abnormal     Comment: 150   pH, UA   6    POC Specific Gravity, Urine 1.003 - 1.029 1.010    POC Leukocytes, Urine Negative Positive Abnormal     Comment: 500   Resulting Agency   [facility]           Vital Signs - Last Recorded  Most recent update: 2023  2:22 PM  BP   114/72       Pulse   60       Temp   98.6 °F (37 °C)       Resp   18       Ht   5' 2.5" (1.588 m)         Wt   100.7 kg (222 lb)    SpO2   99%    BMI   39.96 kg/m²        Medications at End of Encounter    aluminum hydrox-magnesium carb  mg/15 mL Susp (Taking) Take 10 mLs by mouth every 6 (six) hours as needed.   bisoprolol (ZEBETA) 5 MG tablet (Taking) Take 1 tablet (5 mg total) by mouth once daily.   Number of times this order has been changed since signin    Order Audit [place]    calcium carbonate (TUMS) 200 mg calcium (500 mg) chewable tablet (Taking) Take 1 tablet by mouth daily as needed.   cycloSPORINE (RESTASIS) 0.05 % ophthalmic emulsion (Taking)    Number of times this order has been changed since signin    Order Audit Trail    dapagliflozin propanediol (FARXIGA ORAL) (Taking) Take by mouth.   dexlansoprazole (DEXILANT) 60 mg capsule (Taking) Take 60 mg by mouth.   dofetilide (TIKOSYN) 125 MCG Cap (Taking) Take 125 mcg by mouth.   doxepin (SINEQUAN) 75 MG capsule (Taking) Take 75 mg by mouth.   DULoxetine (CYMBALTA) 20 MG capsule (Taking) Take 20 mg by mouth.   ELIQUIS 5 mg Tab (Taking) Take 5 mg by mouth 2 (two) times daily.   ergocalciferol 8000 units/mL Drop liquid (PEDS) (Taking) Take by mouth.   ezetimibe (ZETIA) 10 mg tablet (Taking) Take 1 tablet (10 mg total) by mouth once daily.   Number of times this order has been changed since signin    Order Audit Trail    famotidine (PEPCID) 40 MG " tablet (Taking) Take 40 mg by mouth once daily.   fluticasone propionate (FLONASE) 50 mcg/actuation nasal spray (Taking) SMARTSI Both Nares Daily PRN   furosemide (LASIX) 20 MG tablet (Taking) Take 20 mg by mouth daily as needed.   isomethepten-caf-acetaminophen 65- mg Tab (Taking) Take 1 tablet by mouth daily as needed.   lisinopriL 10 MG tablet (Taking) TAKE 1 TABLET BY MOUTH EVERY DAY   Number of times this order has been changed since signin    Order Audit [place]    montelukast (SINGULAIR) 10 mg tablet (Taking) TAKE 1 TABLET BY MOUTH EVERY DAY IN THE EVENING   Number of times this order has been changed since signin    Order Audit Trail    NURTEC 75 mg odt (Taking) TAKE 1 TABLET BY MOUTH EVERY OTHER DAY (ALLOW TABLET TO DISSOLVE ON TONGUE)   Number of times this order has been changed since signin    Order Audit Trail    polyethylene glycol (GLYCOLAX) 17 gram/dose powder (Taking) Take 17 g by mouth daily as needed.   pregabalin (LYRICA) 150 MG capsule (Taking) Take 1 capsule (150 mg total) by mouth 3 (three) times daily.   Number of times this order has been changed since signin    Order Audit Trail    QULIPTA 30 mg Tab (Taking) Take 1 tablet by mouth daily as needed.   TOPAMAX 50 mg tablet (Taking) Take by mouth. 50 a qm   100 q hs   Number of times this order has been changed since signin    Order Audit [place]    traMADoL (ULTRAM) 50 mg tablet (Taking) Take 50 mg by mouth 3 (three) times daily as needed.   Number of times this order has been changed since signin    Order Audit [place]    albuterol (PROVENTIL HFA) 90 mcg/actuation inhaler Inhale 2 puffs into the lungs every 6 (six) hours as needed for Wheezing. Rescue   Number of times this order has been changed since signin    Order Audit [place]    nitrofurantoin, macrocrystal-monohydrate, (MACROBID) 100 MG capsule Take 1 capsule (100 mg total) by mouth 2 (two) times daily. for 7 days   Number of times this order has been  changed since signin    Order Audit [place]          Visit Diagnoses and Associated Orders    Acute cystitis with hematuria  - Primary   ICD-10-CM: N30.01   ICD-9-CM: 595.0   Urine culture [JPL157 Custom]    nitrofurantoin, macrocrystal-monohydrate, (MACROBID) 100 MG capsule [32182]        Dysuria    ICD-10-CM: R30.0   ICD-9-CM: 788.1   POCT Urinalysis, Dipstick, Manual, w/o Scope [EBR498 Custom]        Glucose found in urine on examination    ICD-10-CM: R81   ICD-9-CM: 791.5   POCT Glucose, Hand-Held Device [POC10 Custom]                 Problem List  as of 2023     Noted - Resolved   Neuro   Migraine 2022 - Present   All Assessment & Plan Notes   Cardiac/Vascular   Chronic congestive heart failure 2021 - Present   All Assessment & Plan Notes   Essential (primary) hypertension 2022 - Present   All Assessment & Plan Notes   Hyperlipidemia 2022 - Present   All Assessment & Plan Notes   Paroxysmal atrial fibrillation 2018 - Present   Overview Addendum 2023  4:24 PM by Lenora Ambriz MD    S/p pacemaker placement       All Assessment & Plan Notes   Renal/   Chronic kidney disease 2022 - Present   All Assessment & Plan Notes   GI   Gastroesophageal reflux disease 2022 - Present   All Assessment & Plan Notes   Chronic abdominal pain 2021 - Present   All Assessment & Plan Notes       Results for orders placed or performed in visit on 23   Urine culture    Specimen: Urine, Clean Catch   Result Value Ref Range    Urine Culture 75,000-99,000 colonies/ml Escherichia coli (A)        Susceptibility    Escherichia coli -  (no method available)     Amox/K Clav 4 Sensitive      Ampicillin >=32 Resistant      Cefepime <=0.12 Sensitive      Ceftriaxone <=0.25 Sensitive      Cefuroxime 4 Sensitive      Ciprofloxacin <=0.25 Sensitive      Gentamicin <=1 Sensitive      Levofloxacin <=0.12 Sensitive      Meropenem <=0.25 Sensitive      Nitrofurantoin <=16 Sensitive       Piperacillin/Tazobactam <=4 Sensitive      Trimethoprim/Sulfamethoxazole <=20 Sensitive      Tetracycline <=1 Sensitive      Tobramycin <=1 Sensitive

## 2023-05-11 NOTE — PATIENT INSTRUCTIONS
Will culture urine.  Takes about 3 days to finalize. Will call you with results. In the meantime take antibiotic Macrobid as directed. Force fluids, Tylenol or Motrin for discomfort. ER for severe worsening.

## 2023-05-13 LAB — BACTERIA UR CULT: ABNORMAL

## 2023-06-01 ENCOUNTER — OFFICE VISIT (OUTPATIENT)
Dept: INTERNAL MEDICINE | Facility: CLINIC | Age: 68
End: 2023-06-01
Payer: MEDICARE

## 2023-06-01 VITALS
HEIGHT: 63 IN | DIASTOLIC BLOOD PRESSURE: 68 MMHG | HEART RATE: 55 BPM | RESPIRATION RATE: 18 BRPM | BODY MASS INDEX: 39.87 KG/M2 | SYSTOLIC BLOOD PRESSURE: 128 MMHG | OXYGEN SATURATION: 95 % | WEIGHT: 225 LBS

## 2023-06-01 DIAGNOSIS — I10 ESSENTIAL (PRIMARY) HYPERTENSION: ICD-10-CM

## 2023-06-01 DIAGNOSIS — I50.9 CHRONIC CONGESTIVE HEART FAILURE, UNSPECIFIED HEART FAILURE TYPE: ICD-10-CM

## 2023-06-01 DIAGNOSIS — Z01.818 PREOP EXAM FOR INTERNAL MEDICINE: Primary | ICD-10-CM

## 2023-06-01 PROBLEM — E66.01 MORBID OBESITY: Status: ACTIVE | Noted: 2023-06-01

## 2023-06-01 PROBLEM — I77.4 CELIAC ARTERY COMPRESSION SYNDROME: Status: ACTIVE | Noted: 2023-06-01

## 2023-06-01 PROCEDURE — 99214 OFFICE O/P EST MOD 30 MIN: CPT | Mod: ,,, | Performed by: STUDENT IN AN ORGANIZED HEALTH CARE EDUCATION/TRAINING PROGRAM

## 2023-06-01 PROCEDURE — 99214 PR OFFICE/OUTPT VISIT, EST, LEVL IV, 30-39 MIN: ICD-10-PCS | Mod: ,,, | Performed by: STUDENT IN AN ORGANIZED HEALTH CARE EDUCATION/TRAINING PROGRAM

## 2023-06-01 RX ORDER — ERGOCALCIFEROL 1.25 MG/1
50000 CAPSULE ORAL
COMMUNITY

## 2023-06-08 NOTE — PROGRESS NOTES
Subjective:      Annalise Castelan  2023  [id number]      Chief Complaint: Follow-up (6 month b/p follow up)       HPI:  Ms persaud presents for follow up and surgical clearance. Patient is scheduled for right total knee arthroplasty in July. Denies complaints of shortness of breath or chest pain. Blood pressure is well controlled.     Past Medical History:   Diagnosis Date    GERD (gastroesophageal reflux disease)     Hypothyroidism      Past Surgical History:   Procedure Laterality Date    2 c-sections      2 heart ablation      APPENDECTOMY       SECTION   &     CHOLECYSTECTOMY      HYSTERECTOMY      right shoulder repair      TUBAL LIGATION       Family History   Problem Relation Age of Onset    No Known Problems Mother     Heart disease Father     Hypertension Father     Stroke Father     Hyperlipidemia Sister     Kidney disease Brother      Social History     Tobacco Use    Smoking status: Never    Smokeless tobacco: Never   Substance and Sexual Activity    Alcohol use: Not Currently    Drug use: Never    Sexual activity: Not Currently     Partners: Male     Birth control/protection: Abstinence     Review of patient's allergies indicates:   Allergen Reactions    Nsaids (non-steroidal anti-inflammatory drug) Other (See Comments)     HEADACHES  HEADACHE    HEADACHE      Oxycodone hcl-oxycodone-asa Other (See Comments)     hallucinations    Pantoprazole Nausea Only and Other (See Comments)     HEADACHES  HEADACHES      Pseudoephedrine Nausea Only     Other reaction(s): Headache      Statins-hmg-coa reductase inhibitors Other (See Comments)     HEADACHE  HEADACHE    Other reaction(s): Headache       The following were reviewed at this visit: active problem list, medication list, allergies, family history, social history, and health maintenance.    Medications:    Current Outpatient Medications:     aluminum hydrox-magnesium carb  mg/15 mL Susp, Take 10 mLs by mouth every 6 (six) hours as  needed., Disp: , Rfl:     bisoprolol (ZEBETA) 5 MG tablet, Take 1 tablet (5 mg total) by mouth once daily., Disp: 90 tablet, Rfl: 3    calcium carbonate (TUMS) 200 mg calcium (500 mg) chewable tablet, Take 1 tablet by mouth daily as needed., Disp: , Rfl:     cycloSPORINE (RESTASIS) 0.05 % ophthalmic emulsion, , Disp: , Rfl:     dapagliflozin propanediol (FARXIGA ORAL), Take by mouth., Disp: , Rfl:     dexlansoprazole (DEXILANT) 60 mg capsule, Take 60 mg by mouth., Disp: , Rfl:     dofetilide (TIKOSYN) 125 MCG Cap, Take 125 mcg by mouth., Disp: , Rfl:     doxepin (SINEQUAN) 75 MG capsule, Take 75 mg by mouth., Disp: , Rfl:     DULoxetine (CYMBALTA) 20 MG capsule, Take 20 mg by mouth., Disp: , Rfl:     ELIQUIS 5 mg Tab, Take 5 mg by mouth 2 (two) times daily., Disp: , Rfl:     ergocalciferol (VITAMIN D2) 50,000 unit Cap, Take 50,000 Units by mouth every 7 days., Disp: , Rfl:     ergocalciferol 8000 units/mL Drop liquid (PEDS), Take by mouth., Disp: , Rfl:     ezetimibe (ZETIA) 10 mg tablet, Take 1 tablet (10 mg total) by mouth once daily., Disp: 90 tablet, Rfl: 3    famotidine (PEPCID) 40 MG tablet, Take 40 mg by mouth once daily., Disp: , Rfl:     fluticasone propionate (FLONASE) 50 mcg/actuation nasal spray, SMARTSI Both Nares Daily PRN, Disp: , Rfl:     furosemide (LASIX) 20 MG tablet, Take 20 mg by mouth daily as needed., Disp: , Rfl:     isomethepten-caf-acetaminophen 65- mg Tab, Take 1 tablet by mouth daily as needed., Disp: , Rfl:     lisinopriL 10 MG tablet, TAKE 1 TABLET BY MOUTH EVERY DAY, Disp: 90 tablet, Rfl: 1    montelukast (SINGULAIR) 10 mg tablet, TAKE 1 TABLET BY MOUTH EVERY DAY IN THE EVENING, Disp: 90 tablet, Rfl: 0    NURTEC 75 mg odt, TAKE 1 TABLET BY MOUTH EVERY OTHER DAY (ALLOW TABLET TO DISSOLVE ON TONGUE), Disp: 8 tablet, Rfl: 11    polyethylene glycol (GLYCOLAX) 17 gram/dose powder, Take 17 g by mouth daily as needed., Disp: , Rfl:     pregabalin (LYRICA) 150 MG capsule, Take 1  "capsule (150 mg total) by mouth 3 (three) times daily., Disp: 90 capsule, Rfl: 0    QULIPTA 30 mg Tab, Take 1 tablet by mouth daily as needed., Disp: , Rfl:     TOPAMAX 50 mg tablet, Take by mouth. 50 a qm  100 q hs, Disp: , Rfl:     traMADoL (ULTRAM) 50 mg tablet, Take 50 mg by mouth 3 (three) times daily as needed., Disp: , Rfl:     albuterol (PROVENTIL HFA) 90 mcg/actuation inhaler, Inhale 2 puffs into the lungs every 6 (six) hours as needed for Wheezing. Rescue, Disp: 6.7 g, Rfl: 0      Medications have been reviewed and reconciled with patient at this visit.  Barriers to medications reviewed with patient.    Adverse reactions to current medications reviewed with patient..    Over the counter medications reviewed and reconciled with patient.  Review of Systems   Constitutional:  Negative for chills, fever, malaise/fatigue and weight loss.   HENT:  Negative for congestion, ear discharge, ear pain, hearing loss, sinus pain and sore throat.    Eyes:  Negative for photophobia, pain, discharge and redness.   Respiratory:  Negative for cough, shortness of breath and wheezing.    Cardiovascular:  Negative for chest pain, palpitations and leg swelling.   Gastrointestinal:  Negative for constipation, diarrhea, heartburn, nausea and vomiting.   Genitourinary:  Negative for dysuria, frequency and urgency.   Musculoskeletal:  Negative for falls, joint pain and myalgias.   Skin:  Negative for itching and rash.   Neurological:  Negative for dizziness, focal weakness, weakness and headaches.   Psychiatric/Behavioral:  Negative for depression and memory loss. The patient is not nervous/anxious and does not have insomnia.          Objective:      Vitals:    06/01/23 0932   BP: 128/68   BP Location: Right arm   Patient Position: Sitting   BP Method: Small (Automatic)   Pulse: (!) 55   Resp: 18   SpO2: 95%   Weight: 102.1 kg (225 lb)   Height: 5' 3" (1.6 m)       Physical Exam  Constitutional:       General: She is not in acute " distress.     Appearance: Normal appearance.   HENT:      Head: Normocephalic and atraumatic.   Eyes:      Extraocular Movements: Extraocular movements intact.      Pupils: Pupils are equal, round, and reactive to light.   Cardiovascular:      Rate and Rhythm: Normal rate and regular rhythm.      Pulses: Normal pulses.      Heart sounds: Normal heart sounds. No murmur heard.    No friction rub. No gallop.   Pulmonary:      Effort: Pulmonary effort is normal.      Breath sounds: Normal breath sounds. No wheezing, rhonchi or rales.   Abdominal:      General: Abdomen is flat. Bowel sounds are normal. There is no distension.      Palpations: Abdomen is soft.      Tenderness: There is no abdominal tenderness.   Musculoskeletal:         General: No swelling or tenderness. Normal range of motion.      Cervical back: Normal range of motion and neck supple. No tenderness.      Right lower leg: No edema.      Left lower leg: No edema.   Lymphadenopathy:      Cervical: No cervical adenopathy.   Skin:     Findings: No lesion or rash.   Neurological:      General: No focal deficit present.      Mental Status: She is alert and oriented to person, place, and time.      Cranial Nerves: No cranial nerve deficit.      Sensory: No sensory deficit.      Motor: No weakness.   Psychiatric:         Mood and Affect: Mood normal.         Behavior: Behavior normal.         Thought Content: Thought content normal.             Assessment and Plan:       1. Preop exam for internal medicine  Assessment & Plan:  Labs sent from [facility]. Significant for slight anemia hemoglobin of 11.2, will need to monitor this after surgery  Creatinine of 1.27 improved compared to 1 year ago  Patient is at low risk for low risk procedure  No further testing needed prior to surgery       2. Essential (primary) hypertension  Assessment & Plan:  Controlled  Continue current medications       3. Chronic congestive heart failure, unspecified heart failure  type  Assessment & Plan:  Stable no symptoms or signs of exacerbation   Continue current medications                Follow up: Follow up in about 2 months (around 8/1/2023) for Diabetes f/u.

## 2023-06-13 PROBLEM — Z01.818 PREOP EXAM FOR INTERNAL MEDICINE: Status: ACTIVE | Noted: 2022-06-29

## 2023-06-13 NOTE — ASSESSMENT & PLAN NOTE
Labs sent from [facility], reviewed. Significant for slight anemia hemoglobin of 11.2, will need to monitor this after surgery  Creatinine of 1.27 improved compared to 1 year ago  Patient is at low risk for low risk procedure  No further testing needed prior to surgery

## 2023-07-06 DIAGNOSIS — J30.9 ALLERGIC RHINITIS, UNSPECIFIED SEASONALITY, UNSPECIFIED TRIGGER: ICD-10-CM

## 2023-07-06 RX ORDER — MONTELUKAST SODIUM 10 MG/1
10 TABLET ORAL NIGHTLY
Qty: 90 TABLET | Refills: 0 | Status: SHIPPED | OUTPATIENT
Start: 2023-07-06

## 2023-07-17 DIAGNOSIS — I10 ESSENTIAL (PRIMARY) HYPERTENSION: ICD-10-CM

## 2023-07-17 DIAGNOSIS — E78.5 HYPERLIPIDEMIA, UNSPECIFIED HYPERLIPIDEMIA TYPE: ICD-10-CM

## 2023-07-17 RX ORDER — LISINOPRIL 10 MG/1
TABLET ORAL
Qty: 90 TABLET | Refills: 3 | Status: SHIPPED | OUTPATIENT
Start: 2023-07-17

## 2023-07-17 RX ORDER — EZETIMIBE 10 MG/1
TABLET ORAL
Qty: 90 TABLET | Refills: 2 | Status: SHIPPED | OUTPATIENT
Start: 2023-07-17

## 2023-07-28 ENCOUNTER — TELEPHONE (OUTPATIENT)
Dept: URGENT CARE | Facility: CLINIC | Age: 68
End: 2023-07-28

## 2023-07-28 ENCOUNTER — OFFICE VISIT (OUTPATIENT)
Dept: URGENT CARE | Facility: CLINIC | Age: 68
End: 2023-07-28
Payer: MEDICARE

## 2023-07-28 VITALS
HEIGHT: 63 IN | TEMPERATURE: 99 F | HEART RATE: 61 BPM | BODY MASS INDEX: 38.27 KG/M2 | DIASTOLIC BLOOD PRESSURE: 60 MMHG | OXYGEN SATURATION: 100 % | SYSTOLIC BLOOD PRESSURE: 100 MMHG | RESPIRATION RATE: 20 BRPM | WEIGHT: 216 LBS

## 2023-07-28 DIAGNOSIS — J02.9 ACUTE PHARYNGITIS, UNSPECIFIED ETIOLOGY: Primary | ICD-10-CM

## 2023-07-28 DIAGNOSIS — J02.9 SORE THROAT: ICD-10-CM

## 2023-07-28 LAB
CTP QC/QA: YES
CTP QC/QA: YES
MOLECULAR STREP A: NEGATIVE
SARS-COV-2 RDRP RESP QL NAA+PROBE: NEGATIVE

## 2023-07-28 PROCEDURE — 99213 OFFICE O/P EST LOW 20 MIN: CPT | Mod: 25,,, | Performed by: FAMILY MEDICINE

## 2023-07-28 PROCEDURE — 99213 PR OFFICE/OUTPT VISIT, EST, LEVL III, 20-29 MIN: ICD-10-PCS | Mod: 25,,, | Performed by: FAMILY MEDICINE

## 2023-07-28 PROCEDURE — 87635 SARS-COV-2 COVID-19 AMP PRB: CPT | Mod: QW,,, | Performed by: FAMILY MEDICINE

## 2023-07-28 PROCEDURE — 87635: ICD-10-PCS | Mod: QW,,, | Performed by: FAMILY MEDICINE

## 2023-07-28 PROCEDURE — 87651 POCT STREP A MOLECULAR: ICD-10-PCS | Mod: QW,,, | Performed by: FAMILY MEDICINE

## 2023-07-28 PROCEDURE — 87651 STREP A DNA AMP PROBE: CPT | Mod: QW,,, | Performed by: FAMILY MEDICINE

## 2023-07-28 RX ORDER — IPRATROPIUM BROMIDE 21 UG/1
2 SPRAY, METERED NASAL 2 TIMES DAILY
Qty: 30 ML | Refills: 0 | Status: SHIPPED | OUTPATIENT
Start: 2023-07-28 | End: 2023-08-27

## 2023-07-28 RX ORDER — AMOXICILLIN 875 MG/1
875 TABLET, FILM COATED ORAL EVERY 12 HOURS
Qty: 10 TABLET | Refills: 0 | Status: SHIPPED | OUTPATIENT
Start: 2023-07-28 | End: 2023-08-02

## 2023-07-28 RX ORDER — AMOXICILLIN 500 MG/1
500 TABLET, FILM COATED ORAL 2 TIMES DAILY
COMMUNITY
Start: 2023-04-06

## 2023-07-28 NOTE — PROGRESS NOTES
"Subjective:      Patient ID: Annalise Castelan is a 67 y.o. female.    Vitals:  height is 5' 3" (1.6 m) and weight is 98 kg (216 lb). Her temperature is 98.6 °F (37 °C). Her blood pressure is 100/60 and her pulse is 61. Her respiration is 20 and oxygen saturation is 100%.     Chief Complaint: Sore Throat (Sore throat, headache, PND,right ear pain and pressure, nasal congestion, Started Tuesday. Started leftover amoxicillin Tuesday with no help. )    HPI:  67-year-old female present to clinic with concerns of poor sore throat, headache, right ear pain and pressure associated with congestion since 4 days.  Symptoms gradual in onset and worsening.  No measured fever at home.  Currently on leftover amoxicillin.  No concerns of positive exposure to infections.  No recent travel.  Patient is vaccinated for COVID-19.  The states prefers Z-Lennox as antibiotic as it helped in the past.  Discussed in detail on the risk with concomitant use of Z-Lennox and other listed medications.     ROS :  Constitutional : _ No fever , positive for body aches and chills, positive for headache  Eyes : _No redness, drainage or pain  HENT_sore throat, postnasal drainage  Respiratory_no wheezing, no shortness of breath  Cardiovascular_no chest pain  Gastrointestinal_ denies nausea vomiting or abdominal pain  Musculoskeletal_no joint pain, no joint swelling  Integumentary_no skin rash    Objective:     Physical Exam  General : Alert and Oriented, No apparent distress, afebrile, appears comfortable sitting in exam chair, clear speech  Neck - supple  HENT : Oropharynx mild redness, no swelling, tonsils 2+ bilateral, no exudate, right ear canal excessive cerumen, left TM intact no redness    Respiratory : Bilateral equal breath sounds, nonlabored respirations  Cardiovascular : Rate, rhythm regular, normal volume pulse, no murmur  Integumentary : Warm, Dry and no rash    Assessment:     1. Acute pharyngitis, unspecified etiology    2. Sore throat      Plan: "   Discussed the physical finding, condition and course.  Monitor the symptoms.  Strep test negative, COVID-19 test negative.    Warm saltwater gargles for sore throat.  Tylenol for pain and fever.  Antibiotics for worsening symptoms and signs of infection.  Call this clinic for any questions    Acute pharyngitis, unspecified etiology  -     amoxicillin (AMOXIL) 875 MG tablet; Take 1 tablet (875 mg total) by mouth every 12 (twelve) hours. for 5 days  Dispense: 10 tablet; Refill: 0    Sore throat  -     POCT Strep A, Molecular  -     POCT COVID-19 Rapid Screening      Patient call clinic for Atrovent nasal spray as it helped in the past.  Prescription sent to the pharmacy.

## 2023-07-28 NOTE — TELEPHONE ENCOUNTER
Pt : 1955. Pt called and stated she is not happy because Dr Shipman prescribed her Amoxicillin and she wanted the Atrovent nasal spray. She also stated that she has had the nasal spray before and it helped with the runny nose and she was not gonna come back and tell others not to come back. Dr. Shipman's note stated that the Atrovent was an inhaler, VI ROSA MA googled the nasal spray and the brand name is Ipratropium bromide. The pt didn't mention the name in the first phone call. Dr. Shipman stated she has sent the prescription. Phone Number is [phone]. Please Advise....

## 2023-07-28 NOTE — PATIENT INSTRUCTIONS
Discussed the physical finding, condition and course.  Monitor the symptoms.  Strep test negative, COVID-19 test negative.    Warm saltwater gargles for sore throat.  Tylenol for pain and fever.  Antibiotics for worsening symptoms and signs of infection.  Call this clinic for any questions

## 2023-08-02 DIAGNOSIS — E11.9 TYPE 2 DIABETES MELLITUS WITHOUT COMPLICATION, WITHOUT LONG-TERM CURRENT USE OF INSULIN: ICD-10-CM

## 2023-08-02 DIAGNOSIS — N18.30 STAGE 3 CHRONIC KIDNEY DISEASE, UNSPECIFIED WHETHER STAGE 3A OR 3B CKD: Primary | ICD-10-CM

## 2023-08-22 ENCOUNTER — LAB REQUISITION (OUTPATIENT)
Dept: LAB | Facility: HOSPITAL | Age: 68
End: 2023-08-22
Payer: MEDICARE

## 2023-08-22 DIAGNOSIS — J32.8 OTHER CHRONIC SINUSITIS: ICD-10-CM

## 2023-08-22 LAB — KOH PREP SPEC: NORMAL

## 2023-08-22 PROCEDURE — 87102 FUNGUS ISOLATION CULTURE: CPT | Performed by: OTOLARYNGOLOGY

## 2023-08-22 PROCEDURE — 87205 SMEAR GRAM STAIN: CPT | Performed by: OTOLARYNGOLOGY

## 2023-08-22 PROCEDURE — 87220 TISSUE EXAM FOR FUNGI: CPT | Performed by: OTOLARYNGOLOGY

## 2023-08-22 PROCEDURE — 87070 CULTURE OTHR SPECIMN AEROBIC: CPT | Performed by: OTOLARYNGOLOGY

## 2023-08-23 LAB — GRAM STN SPEC: NORMAL

## 2023-08-24 LAB — BACTERIA SPEC CULT: NORMAL

## 2023-09-25 LAB — FUNGUS SPEC CULT: NORMAL

## 2023-10-19 DIAGNOSIS — I10 ESSENTIAL (PRIMARY) HYPERTENSION: ICD-10-CM

## 2023-10-19 RX ORDER — BISOPROLOL FUMARATE 5 MG/1
5 TABLET, FILM COATED ORAL
Qty: 90 TABLET | Refills: 1 | Status: SHIPPED | OUTPATIENT
Start: 2023-10-19

## 2024-04-15 ENCOUNTER — TELEPHONE (OUTPATIENT)
Dept: INTERNAL MEDICINE | Facility: CLINIC | Age: 69
End: 2024-04-15
Payer: MEDICARE

## 2024-05-05 DIAGNOSIS — I10 ESSENTIAL (PRIMARY) HYPERTENSION: ICD-10-CM

## 2024-05-06 RX ORDER — BISOPROLOL FUMARATE 5 MG/1
5 TABLET, FILM COATED ORAL
Qty: 90 TABLET | Refills: 1 | Status: SHIPPED | OUTPATIENT
Start: 2024-05-06

## 2024-05-20 ENCOUNTER — TELEPHONE (OUTPATIENT)
Dept: INTERNAL MEDICINE | Facility: CLINIC | Age: 69
End: 2024-05-20
Payer: MEDICARE

## 2024-05-20 NOTE — TELEPHONE ENCOUNTER
----- Message from Echo Zuleta sent at 5/20/2024 11:53 AM CDT -----  .Type:  RX Refill Request    Who Called: Shraddha with [employer]  Refill or New Rx:refill  RX Name and Strength:lisinopriL 10 MG tablet  How is the patient currently taking it? (ex. 1XDay):1/day  Is this a 30 day or 90 day RX:90  Preferred Pharmacy with phone number:[facility]  Local or Mail Order:local  Ordering Provider:Demetrio  Would the patient rather a call back or a response via [facility]?   Best Call Back Number:[phone]  Additional Information: lisinopriL 10 MG tablet

## 2024-07-14 DIAGNOSIS — I10 ESSENTIAL (PRIMARY) HYPERTENSION: ICD-10-CM

## 2024-07-15 RX ORDER — LISINOPRIL 10 MG/1
TABLET ORAL
Qty: 90 TABLET | Refills: 2 | Status: SHIPPED | OUTPATIENT
Start: 2024-07-15

## 2024-08-26 ENCOUNTER — OFFICE VISIT (OUTPATIENT)
Dept: NEUROLOGY | Facility: CLINIC | Age: 69
End: 2024-08-26
Payer: MEDICARE

## 2024-08-26 VITALS
BODY MASS INDEX: 37.92 KG/M2 | HEART RATE: 50 BPM | HEIGHT: 63 IN | WEIGHT: 214 LBS | DIASTOLIC BLOOD PRESSURE: 79 MMHG | SYSTOLIC BLOOD PRESSURE: 151 MMHG

## 2024-08-26 DIAGNOSIS — E66.01 MORBID OBESITY: ICD-10-CM

## 2024-08-26 DIAGNOSIS — G20.C PARKINSONISM, UNSPECIFIED PARKINSONISM TYPE: Primary | ICD-10-CM

## 2024-08-26 PROCEDURE — 99215 OFFICE O/P EST HI 40 MIN: CPT | Mod: S$PBB,,, | Performed by: PSYCHIATRY & NEUROLOGY

## 2024-08-26 PROCEDURE — 99214 OFFICE O/P EST MOD 30 MIN: CPT | Mod: PBBFAC | Performed by: PSYCHIATRY & NEUROLOGY

## 2024-08-26 PROCEDURE — 99999 PR PBB SHADOW E&M-EST. PATIENT-LVL IV: CPT | Mod: PBBFAC,,, | Performed by: PSYCHIATRY & NEUROLOGY

## 2024-08-26 RX ORDER — POTASSIUM CHLORIDE 600 MG/1
8 TABLET, FILM COATED, EXTENDED RELEASE ORAL
COMMUNITY

## 2024-08-26 RX ORDER — SODIUM BICARBONATE 650 MG/1
1 TABLET ORAL EVERY MORNING
COMMUNITY
Start: 2024-08-14

## 2024-08-26 RX ORDER — CARBIDOPA AND LEVODOPA 25; 100 MG/1; MG/1
TABLET ORAL
Qty: 90 TABLET | Refills: 5 | Status: SHIPPED | OUTPATIENT
Start: 2024-08-26

## 2024-08-26 RX ORDER — POVIDONE 50 MG/10ML
SOLUTION/ DROPS OPHTHALMIC
COMMUNITY

## 2024-08-26 RX ORDER — CARVEDILOL 6.25 MG/1
6.25 TABLET ORAL 2 TIMES DAILY
COMMUNITY
Start: 2024-02-28 | End: 2025-07-29

## 2024-08-26 RX ORDER — OXYCODONE HYDROCHLORIDE 15 MG/1
15 TABLET ORAL EVERY 4 HOURS PRN
COMMUNITY
Start: 2023-11-01

## 2024-08-26 NOTE — PROGRESS NOTES
Chief Complaint   Patient presents with    Tremors     Pt states tremors to bilateral jaw and hands started about two years has now progressed to feet; difficulty with hand weakness trouble holding objects and writing dropping objects; tremors are constant and will become worse with anxiety     Migraine     Pt states migraines are daily will wake up with migraine difficulty with CPAP        This is a 68 y.o. female here for follow up.  She was previously  Seen for headaches but is here for tremors. She reports a bilateral jaw and hand tremor that started about 2 years ago in his progressed to the feet.  The tremor is present at rest as well as with action.  She has difficulty holding objects and will drop objects at times.  Also has poor handwriting.  Tremors are worse with anxiety.  They are occurring constantly.  She did see Dr. Trinidad and about a year and a half ago underwent evy scan which was negative.      Still does have daily headaches.  She feels like her sleep machine triggers her headaches in the morning.  She is on Q Ellipta which is helping.  She also takes over-the-counter Tylenol codeine compound in the morning      Medication List with Changes/Refills   New Medications    CARBIDOPA-LEVODOPA  MG (SINEMET)  MG PER TABLET    0.5 tablet TID for 1 week, then increase to 1 tablet PO TID thereafter   Current Medications    ATOGEPANT 60 MG TAB    Take 1 tablet by mouth Daily.    CALCIUM CARBONATE (TUMS) 200 MG CALCIUM (500 MG) CHEWABLE TABLET    Take 1 tablet by mouth daily as needed.    CARVEDILOL (COREG) 6.25 MG TABLET    Take 6.25 mg by mouth 2 (two) times daily.    DAPAGLIFLOZIN PROPANEDIOL (FARXIGA) 10 MG TABLET    Take 10 mg by mouth once daily.    DEXLANSOPRAZOLE (DEXILANT) 60 MG CAPSULE    Take 60 mg by mouth Daily.    DOFETILIDE (TIKOSYN) 125 MCG CAP    Take 125 mcg by mouth every 12 (twelve) hours.    DOXEPIN (SINEQUAN) 75 MG CAPSULE    Take 75 mg by mouth every evening.    DULOXETINE  (CYMBALTA) 20 MG CAPSULE    Take 20 mg by mouth every evening.    ELIQUIS 5 MG TAB    Take 5 mg by mouth 2 (two) times daily.    ERGOCALCIFEROL 8000 UNITS/ML DROP LIQUID (PEDS)    Take by mouth.    EZETIMIBE (ZETIA) 10 MG TABLET    TAKE 1 TABLET BY MOUTH EVERY DAY    FAMOTIDINE (PEPCID) 40 MG TABLET    Take 40 mg by mouth once daily.    ISOMETHEPTEN-CAF-ACETAMINOPHEN 65- MG TAB    Take 1 tablet by mouth daily as needed.    LISINOPRIL 10 MG TABLET    TAKE 1 TABLET BY MOUTH EVERY DAY    MONTELUKAST (SINGULAIR) 10 MG TABLET    Take 1 tablet (10 mg total) by mouth every evening.    OXYCODONE (ROXICODONE) 15 MG TAB    Take 15 mg by mouth every 4 (four) hours as needed.    POTASSIUM CHLORIDE (KLOR-CON 8) 8 MEQ TBSR    Take 8 mEq by mouth as needed.    POVIDONE, PF, (IVIZIA, PF,) 0.5 % DROP    Apply to eye.    SODIUM BICARBONATE 650 MG TABLET    Take 1 tablet by mouth every morning.    TOPAMAX 50 MG TABLET    Take 50 mg by mouth. 50 mg qAM and 100mg qPM    TRAMADOL (ULTRAM) 50 MG TABLET    Take 50 mg by mouth 3 (three) times daily as needed.   Discontinued Medications    ALBUTEROL (PROVENTIL HFA) 90 MCG/ACTUATION INHALER    Inhale 2 puffs into the lungs every 6 (six) hours as needed for Wheezing. Rescue    ALUMINUM HYDROX-MAGNESIUM CARB  MG/15 ML SUSP    Take 10 mLs by mouth every 6 (six) hours as needed.    AMOXICILLIN (AMOXIL) 500 MG TAB    Take 500 mg by mouth 2 (two) times daily.    BISOPROLOL (ZEBETA) 5 MG TABLET    TAKE 1 TABLET BY MOUTH EVERY DAY    CYCLOSPORINE (RESTASIS) 0.05 % OPHTHALMIC EMULSION        ERGOCALCIFEROL (ERGOCALCIFEROL) 50,000 UNIT CAP    Take 50,000 Units by mouth every 7 days.    FLUTICASONE PROPIONATE (FLONASE) 50 MCG/ACTUATION NASAL SPRAY    SMARTSI Both Nares Daily PRN    FUROSEMIDE (LASIX) 20 MG TABLET    Take 20 mg by mouth daily as needed.    NURTEC 75 MG ODT    TAKE 1 TABLET BY MOUTH EVERY OTHER DAY (ALLOW TABLET TO DISSOLVE ON TONGUE)    POLYETHYLENE GLYCOL (GLYCOLAX) 17  GRAM/DOSE POWDER    Take 17 g by mouth daily as needed.    PREGABALIN (LYRICA) 150 MG CAPSULE    Take 1 capsule (150 mg total) by mouth 3 (three) times daily.        Vitals:    08/26/24 1049   BP: (!) 151/79   Pulse: (!) 50        General: alert and oriented, no acute distress, no audible wheezes, pulse intact, no edema    Cognition and Comprehension  Speech and language intact  Follows commands  Speech fluent  Attention intact  Memory for recent events intact from history taking  Affect pleasant  Fund of knowledge adequate    Resting tremor lips  hypomimia    Cranial nerves  II. Optic: Visual fields full to confrontation both eyes  III, IV, VI. Oculomotor: Intact, Pupils equal, round and reactive to light, no nystagmus  V. Trigeminal: sensation to light touch normal  VII. No facial asymmetry or facial weakness  VIII. Hearing intact to spoken voice  IX/X. Glossopharyngeal/Vagus: Voice normal, palate rises symmetrically  XI. Axillary: Shoulder shrug normal  XII. Hypoglossal: Intact    Muscle Strength and Tone  Normal upper extremity tone  Normal lower extremity tone  Normal upper extremity strength  Normal lower extremity strength  Resting tremor LUE>RUE    Coordination and Gait  Finger to nose normal  Gait normal     1. Parkinsonism, unspecified Parkinsonism type  -     carbidopa-levodopa  mg (SINEMET)  mg per tablet; 0.5 tablet TID for 1 week, then increase to 1 tablet PO TID thereafter  Dispense: 90 tablet; Refill: 5    2. Morbid obesity     Def has parkinsonism, unclear whether drug induced, or disease state. Will try sinemet  Mentioned  other options for treatment of sleep apnea, including GLP 1 agonist given the mask is triggering HA

## 2024-10-05 ENCOUNTER — OFFICE VISIT (OUTPATIENT)
Dept: URGENT CARE | Facility: CLINIC | Age: 69
End: 2024-10-05
Payer: MEDICARE

## 2024-10-05 VITALS
DIASTOLIC BLOOD PRESSURE: 80 MMHG | SYSTOLIC BLOOD PRESSURE: 115 MMHG | TEMPERATURE: 98 F | OXYGEN SATURATION: 100 % | RESPIRATION RATE: 18 BRPM | WEIGHT: 214 LBS | HEART RATE: 50 BPM | HEIGHT: 63 IN | BODY MASS INDEX: 37.92 KG/M2

## 2024-10-05 DIAGNOSIS — N39.0 COMPLICATED URINARY TRACT INFECTION: Primary | ICD-10-CM

## 2024-10-05 DIAGNOSIS — R35.0 URINARY FREQUENCY: ICD-10-CM

## 2024-10-05 LAB
BILIRUB UR QL STRIP: POSITIVE
GLUCOSE UR QL STRIP: POSITIVE
KETONES UR QL STRIP: NEGATIVE
LEUKOCYTE ESTERASE UR QL STRIP: POSITIVE
PH, POC UA: 7
POC BLOOD, URINE: POSITIVE
POC NITRATES, URINE: POSITIVE
PROT UR QL STRIP: POSITIVE
SP GR UR STRIP: 1.01 (ref 1–1.03)
UROBILINOGEN UR STRIP-ACNC: POSITIVE (ref 0.1–1.1)

## 2024-10-05 PROCEDURE — 87077 CULTURE AEROBIC IDENTIFY: CPT | Performed by: FAMILY MEDICINE

## 2024-10-05 PROCEDURE — 81003 URINALYSIS AUTO W/O SCOPE: CPT | Mod: QW,,, | Performed by: FAMILY MEDICINE

## 2024-10-05 PROCEDURE — 87086 URINE CULTURE/COLONY COUNT: CPT | Performed by: FAMILY MEDICINE

## 2024-10-05 PROCEDURE — 96372 THER/PROPH/DIAG INJ SC/IM: CPT | Mod: ,,, | Performed by: FAMILY MEDICINE

## 2024-10-05 PROCEDURE — 99214 OFFICE O/P EST MOD 30 MIN: CPT | Mod: 25,,, | Performed by: FAMILY MEDICINE

## 2024-10-05 RX ORDER — CIPROFLOXACIN 250 MG/1
250 TABLET, FILM COATED ORAL 2 TIMES DAILY
Qty: 14 TABLET | Refills: 0 | Status: SHIPPED | OUTPATIENT
Start: 2024-10-05 | End: 2024-10-12

## 2024-10-05 RX ORDER — CEFTRIAXONE 1 G/1
1 INJECTION, POWDER, FOR SOLUTION INTRAMUSCULAR; INTRAVENOUS
Status: COMPLETED | OUTPATIENT
Start: 2024-10-05 | End: 2024-10-05

## 2024-10-05 RX ADMIN — CEFTRIAXONE 1 G: 1 INJECTION, POWDER, FOR SOLUTION INTRAMUSCULAR; INTRAVENOUS at 01:10

## 2024-10-05 NOTE — PROGRESS NOTES
Patient ID: Annalise Castelan is a 68 y.o. female.  Chief Complaint: Dysuria    HPI:   Patient presents here today for above reason.      Patient is a 68 y.o. female who presents to urgent care with complaints of possible UTI. Details onset of dysuria, frequency, malodorous urine x yesterday. Denies flank/abdominal pain, fever, bladder spasms. No alleviating factors.   Carries with her extensive medical history/past medical history including but not limited to recurrent/frequent UTIs.  Congestive heart failure chronic kidney disease morbid obesity etc..  Last urinary tract infection showed good coverage with ciprofloxacin.  She expresses that that is typically the antibiotic treatment as she is prescribed while during a flare-up of urinary tract infection.  She does not have a urologist.    Past Medical History:  Past Medical History:   Diagnosis Date    GERD (gastroesophageal reflux disease)     Hypothyroidism      Past Surgical History:   Procedure Laterality Date    2 c-sections      2 heart ablation      APPENDECTOMY       SECTION   &     CHOLECYSTECTOMY      HYSTERECTOMY      JOINT REPLACEMENT Right 2023    Right knee    REPAIR OF LIGAMENT      right shoulder repair      TOTAL KNEE ARTHROPLASTY Right     TUBAL LIGATION       Review of patient's allergies indicates:   Allergen Reactions    Nsaids (non-steroidal anti-inflammatory drug) Other (See Comments)     HEADACHES    HEADACHE    Pantoprazole Nausea Only and Other (See Comments)     HEADACHES  HEADACHES      Pseudoephedrine Nausea Only     Other reaction(s): Headache      Statins-hmg-coa reductase inhibitors Other (See Comments)     HEADACHE    Other reaction(s): Headache     Current Outpatient Medications   Medication Instructions    atogepant 60 mg Tab 1 tablet, Daily    calcium carbonate (TUMS) 200 mg calcium (500 mg) chewable tablet 1 tablet, Oral, Daily PRN    carbidopa-levodopa  mg (SINEMET)  mg per tablet 0.5 tablet  "TID for 1 week, then increase to 1 tablet PO TID thereafter    carvediloL (COREG) 6.25 mg, 2 times daily    ciprofloxacin HCl (CIPRO) 250 mg, Oral, 2 times daily    dapagliflozin propanediol (FARXIGA) 10 mg, Daily    dexlansoprazole (DEXILANT) 60 mg, Oral, Daily    dofetilide (TIKOSYN) 125 mcg, Every 12 hours    doxepin (SINEQUAN) 75 mg, Nightly    DULoxetine (CYMBALTA) 20 mg, Nightly    ELIQUIS 5 mg, 2 times daily    ergocalciferol 8000 units/mL Drop liquid (PEDS) Oral    ezetimibe (ZETIA) 10 mg tablet TAKE 1 TABLET BY MOUTH EVERY DAY    famotidine (PEPCID) 40 mg, Oral, Daily    isomethepten-caf-acetaminophen 65- mg Tab 1 tablet, Oral, Daily PRN    lisinopriL 10 MG tablet TAKE 1 TABLET BY MOUTH EVERY DAY    montelukast (SINGULAIR) 10 mg, Oral, Nightly    oxyCODONE (ROXICODONE) 15 mg, Oral, Every 4 hours PRN    potassium chloride (KLOR-CON 8) 8 MEQ TbSR 8 mEq, Oral, As needed (PRN)    povidone, PF, (IVIZIA, PF,) 0.5 % Drop Ophthalmic    sodium bicarbonate 650 MG tablet 1 tablet, Oral, Every morning    TOPAMAX 50 mg    traMADoL (ULTRAM) 50 mg, Oral, 3 times daily PRN     Social History     Socioeconomic History    Marital status:    Tobacco Use    Smoking status: Never    Smokeless tobacco: Never   Substance and Sexual Activity    Alcohol use: Not Currently    Drug use: Never    Sexual activity: Not Currently     Partners: Male     Birth control/protection: Abstinence       ROS:   Review of Systems  12 point review of systems conducted, negative except as stated in the history of present illness. See HPI for details.   Vitals/PE:   Visit Vitals  BP (!) 97/55 (Patient Position: Sitting)   Pulse (!) 50   Temp 97.6 °F (36.4 °C)   Resp 18   Ht 5' 3" (1.6 m)   Wt 97.1 kg (214 lb)   SpO2 100%   BMI 37.91 kg/m²     Physical Exam  Constitutional:       General: She is not in acute distress.     Appearance: She is obese. She is not ill-appearing, toxic-appearing or diaphoretic.   Cardiovascular:      Rate and " Rhythm: Normal rate.      Pulses: Normal pulses.      Heart sounds: Normal heart sounds.   Pulmonary:      Effort: Pulmonary effort is normal.      Breath sounds: Normal breath sounds.   Skin:     General: Skin is warm.      Capillary Refill: Capillary refill takes less than 2 seconds.   Neurological:      General: No focal deficit present.      Mental Status: She is oriented to person, place, and time. Mental status is at baseline.   Psychiatric:         Mood and Affect: Mood normal.         Behavior: Behavior normal.         Thought Content: Thought content normal.         Results for orders placed or performed in visit on 10/05/24   POCT Urinalysis, Dipstick, Automated, W/O Scope    Collection Time: 10/05/24 12:42 PM   Result Value Ref Range    POC Blood, Urine Positive (A) Negative    POC Bilirubin, Urine Positive (A) Negative    POC Urobilinogen, Urine positive 0.1 - 1.1    POC Ketones, Urine Negative Negative    POC Protein, Urine Positive (A) Negative    POC Nitrates, Urine Positive (A) Negative    POC Glucose, Urine Positive (A) Negative    pH, UA 7.0     POC Specific Gravity, Urine 1.010 1.003 - 1.029    POC Leukocytes, Urine Positive (A) Negative     Assessment/Plan:   Complicated urinary tract infection  -     cefTRIAXone injection 1 g  -     Urine Culture High Risk  -     ciprofloxacin HCl (CIPRO) 250 MG tablet; Take 1 tablet (250 mg total) by mouth 2 (two) times daily. for 7 days  Dispense: 14 tablet; Refill: 0  Good outcome/tolerated intramuscular injection of Rocephin well.  Blood pressure increased from initial check.  Now proximally 115/60.  This is typically where she runs and very normal for her baseline blood pressure.  Heart rate is at her baseline of 50 beats per minute etc..  ER precautions for worsening of symptoms/progressive generalized weakness etc..  Patient verbalized understanding.  Urinary frequency  -     POCT Urinalysis, Dipstick, Automated, W/O Scope       Orders Placed This  Encounter   Procedures    Urine Culture High Risk    POCT Urinalysis, Dipstick, Automated, W/O Scope       Education and counseling done face to face regarding medical conditions and plan. Contact office if new symptoms develop. Should any symptoms ever significantly worsen seek emergency medical attention/go to ER. Follow up at least yearly for wellness or sooner PRN. Nurse to call patient with any results. The patient is receptive, expresses understanding and is agreeable to plan. All questions have been answered.

## 2024-10-08 LAB
BACTERIA UR CULT: ABNORMAL
BACTERIA UR CULT: ABNORMAL

## 2024-12-10 ENCOUNTER — TELEPHONE (OUTPATIENT)
Dept: NEUROLOGY | Facility: CLINIC | Age: 69
End: 2024-12-10
Payer: MEDICARE

## 2024-12-10 NOTE — TELEPHONE ENCOUNTER
This morning patient lvm for someone to return her call . I attempted to return the pt call twice but the phone was unresponsive the phone appears to be out of service, and I was unable to leave voice mail

## 2025-01-23 ENCOUNTER — TELEPHONE (OUTPATIENT)
Dept: NEUROLOGY | Facility: CLINIC | Age: 70
End: 2025-01-23

## 2025-01-23 ENCOUNTER — OFFICE VISIT (OUTPATIENT)
Dept: NEUROLOGY | Facility: CLINIC | Age: 70
End: 2025-01-23
Payer: MEDICARE

## 2025-01-23 DIAGNOSIS — G20.C PARKINSONISM, UNSPECIFIED PARKINSONISM TYPE: Primary | ICD-10-CM

## 2025-01-23 DIAGNOSIS — G43.009 MIGRAINE WITHOUT AURA AND WITHOUT STATUS MIGRAINOSUS, NOT INTRACTABLE: ICD-10-CM

## 2025-01-23 PROCEDURE — 98006 SYNCH AUDIO-VIDEO EST MOD 30: CPT | Mod: 95,,, | Performed by: NURSE PRACTITIONER

## 2025-01-23 RX ORDER — FREMANEZUMAB-VFRM 225 MG/1.5ML
225 INJECTION SUBCUTANEOUS
COMMUNITY

## 2025-01-23 NOTE — PROGRESS NOTES
Subjective:       Patient ID: Annalise Castelan is a 69 y.o. female.    Chief Complaint: Tremors      History of Present Illness:  Follow up visit for parkinsonism and migraine.  She was started on sinemet at her last visit as she had developed a jaw tremor that progressed to bilateral hands.  Since starting the medication, she feels like her tremor is better to her hands, but jaw tremor is about the same.  Admittedly, she has a very difficult time remembering to take the midday dose of the medication.  However, even after her morning dose, she does not notice much difference with her jaw tremor.  She denies any medication side effects.  She still wonders if she has tardive dyskinesia and she is interested in having a repeat DATscan done which she tells me has been negative in the past.  This was ordered by Dr. Trinidad roughly 2 years ago.    Still seeing another neurologist in [place] for headaches.  She was just switched from Qulipta to Ajovy.  Has had 2 injections of Ajovy and feels her migraines are worse.              Past Medical History:   Diagnosis Date    GERD (gastroesophageal reflux disease)     Hypothyroidism        Past Surgical History:   Procedure Laterality Date    2 c-sections      2 heart ablation      APPENDECTOMY       SECTION   &     CHOLECYSTECTOMY      HYSTERECTOMY      JOINT REPLACEMENT Right 2023    Right knee    REPAIR OF LIGAMENT      right shoulder repair      TOTAL KNEE ARTHROPLASTY Right     TUBAL LIGATION          Family History   Problem Relation Name Age of Onset    No Known Problems Mother      Heart disease Father Tommie     Hypertension Father Tommie     Stroke Father Tommie     Hyperlipidemia Sister Shraddha     Kidney disease Brother Tommie         Social History     Socioeconomic History    Marital status:    Tobacco Use    Smoking status: Never    Smokeless tobacco: Never   Substance and Sexual Activity    Alcohol use: Not Currently    Drug use: Never     Sexual activity: Not Currently     Partners: Male     Birth control/protection: Abstinence        Outpatient Encounter Medications as of 1/23/2025   Medication Sig Dispense Refill    calcium carbonate (TUMS) 200 mg calcium (500 mg) chewable tablet Take 1 tablet by mouth daily as needed.      carbidopa-levodopa  mg (SINEMET)  mg per tablet 0.5 tablet TID for 1 week, then increase to 1 tablet PO TID thereafter 90 tablet 5    carvediloL (COREG) 6.25 MG tablet Take 6.25 mg by mouth 2 (two) times daily.      dapagliflozin propanediol (FARXIGA) 10 mg tablet Take 10 mg by mouth once daily.      dexlansoprazole (DEXILANT) 60 mg capsule Take 60 mg by mouth Daily.      dofetilide (TIKOSYN) 125 MCG Cap Take 125 mcg by mouth every 12 (twelve) hours.      doxepin (SINEQUAN) 75 MG capsule Take 75 mg by mouth every evening.      DULoxetine (CYMBALTA) 20 MG capsule Take 20 mg by mouth every evening.      ELIQUIS 5 mg Tab Take 5 mg by mouth 2 (two) times daily.      ergocalciferol 8000 units/mL Drop liquid (PEDS) Take by mouth.      ezetimibe (ZETIA) 10 mg tablet TAKE 1 TABLET BY MOUTH EVERY DAY 90 tablet 2    famotidine (PEPCID) 40 MG tablet Take 40 mg by mouth once daily.      fremanezumab-vfrm (AJOVY AUTOINJECTOR) 225 mg/1.5 mL autoinjector Inject 225 mg into the skin every 30 days.      isomethepten-caf-acetaminophen 65- mg Tab Take 1 tablet by mouth daily as needed.      lisinopriL 10 MG tablet TAKE 1 TABLET BY MOUTH EVERY DAY 90 tablet 2    montelukast (SINGULAIR) 10 mg tablet Take 1 tablet (10 mg total) by mouth every evening. 90 tablet 0    oxyCODONE (ROXICODONE) 15 MG Tab Take 15 mg by mouth every 4 (four) hours as needed.      potassium chloride (KLOR-CON 8) 8 MEQ TbSR Take 8 mEq by mouth as needed.      povidone, PF, (IVIZIA, PF,) 0.5 % Drop Apply to eye.      sodium bicarbonate 650 MG tablet Take 1 tablet by mouth every morning.      TOPAMAX 50 mg tablet Take 50 mg by mouth. 50 mg qAM and 100mg qPM       traMADoL (ULTRAM) 50 mg tablet Take 50 mg by mouth 3 (three) times daily as needed.      [DISCONTINUED] atogepant 60 mg Tab Take 1 tablet by mouth Daily.       No facility-administered encounter medications on file as of 1/23/2025.      Objective:   There were no vitals taken for this visit.       Physical Exam  NAD  alert and oriented  cognition and perception intact  no aphasia  EOMI  no facial asymmetry  no dysarthria  Resting tremor lips  hypomimia       Assessment & Plan:      1. Parkinsonism, unspecified Parkinsonism type  Assessment & Plan:  -evy scan   -continue sinemet for now.  If evy scan positive, will need to increase c/l dosing and potentially try CR or rytary given her difficulty with midday dose  -rtc 3 months with Dr. Napoles      2. Migraine without aura and without status migrainosus, not intractable  Assessment & Plan:  -encouraged to continue ajovy for atleast another couple of months.  If still no improvement, reach out to headache provider for possible switch back to qulipta          The patient location is: her home  The chief complaint leading to consultation is: parkinsonism, migraine    Visit type: audiovisual    Face to Face time with patient: 15  20 minutes of total time spent on the encounter, which includes face to face time and non-face to face time preparing to see the patient (eg, review of tests), Obtaining and/or reviewing separately obtained history, Documenting clinical information in the electronic or other health record, Independently interpreting results (not separately reported) and communicating results to the patient/family/caregiver, or Care coordination (not separately reported).         Each patient to whom he or she provides medical services by telemedicine is:  (1) informed of the relationship between the physician and patient and the respective role of any other health care provider with respect to management of the patient; and (2) notified that he or she may  decline to receive medical services by telemedicine and may withdraw from such care at any time.    Notes:      This note was created with the assistance of voice recognition software. There may be transcription errors as a result of using this technology however minimal. Effort has been made to assure accuracy of transcription but any obvious errors or omissions should be clarified with the author of the document.

## 2025-01-23 NOTE — ASSESSMENT & PLAN NOTE
-encouraged to continue ajovy for atleast another couple of months.  If still no improvement, reach out to headache provider for possible switch back to qulipta

## 2025-01-23 NOTE — ASSESSMENT & PLAN NOTE
-evy scan   -continue sinemet for now.  If evy scan positive, will need to increase c/l dosing and potentially try CR or rytary given her difficulty with midday dose  -rtc 3 months with Dr. Napoles

## 2025-04-22 ENCOUNTER — TELEPHONE (OUTPATIENT)
Dept: NEUROLOGY | Facility: CLINIC | Age: 70
End: 2025-04-22
Payer: MEDICARE

## 2025-05-07 ENCOUNTER — OFFICE VISIT (OUTPATIENT)
Dept: NEUROLOGY | Facility: CLINIC | Age: 70
End: 2025-05-07
Payer: MEDICARE

## 2025-05-07 VITALS
BODY MASS INDEX: 36.14 KG/M2 | HEIGHT: 63 IN | WEIGHT: 204 LBS | DIASTOLIC BLOOD PRESSURE: 81 MMHG | HEART RATE: 64 BPM | SYSTOLIC BLOOD PRESSURE: 129 MMHG

## 2025-05-07 DIAGNOSIS — G20.C PARKINSONISM, UNSPECIFIED PARKINSONISM TYPE: Primary | ICD-10-CM

## 2025-05-07 DIAGNOSIS — G43.909 MIGRAINE WITHOUT STATUS MIGRAINOSUS, NOT INTRACTABLE, UNSPECIFIED MIGRAINE TYPE: ICD-10-CM

## 2025-05-07 PROCEDURE — 99214 OFFICE O/P EST MOD 30 MIN: CPT | Mod: PBBFAC | Performed by: PSYCHIATRY & NEUROLOGY

## 2025-05-07 PROCEDURE — 99999 PR PBB SHADOW E&M-EST. PATIENT-LVL IV: CPT | Mod: PBBFAC,,, | Performed by: PSYCHIATRY & NEUROLOGY

## 2025-05-07 PROCEDURE — 99214 OFFICE O/P EST MOD 30 MIN: CPT | Mod: S$PBB,,, | Performed by: PSYCHIATRY & NEUROLOGY

## 2025-05-07 RX ORDER — ROPINIROLE 0.25 MG/1
0.25 TABLET, FILM COATED ORAL NIGHTLY
COMMUNITY

## 2025-05-07 RX ORDER — CARBIDOPA AND LEVODOPA 25; 100 MG/1; MG/1
1 TABLET, EXTENDED RELEASE ORAL 2 TIMES DAILY
Qty: 60 TABLET | Refills: 11 | Status: SHIPPED | OUTPATIENT
Start: 2025-05-07 | End: 2026-05-07

## 2025-05-07 NOTE — PROGRESS NOTES
Chief Complaint   Patient presents with    Tremors     Here for 3 month f/u for tremors and to discuss BONNY scan results. Pt states she been taking carbidopo-levodopa   mg and she feels like it haven't been helping her tremors.         This is a 69 y.o. female here for follow up for  parkinsonism as well as migraine.  She is seeing another neurologist in [place] for migraine in his currently on Ajovy.  She underwent DaTSCAN which was negative.  Recall she had a DaTSCAN in the  past which was negative as well.  She is taking Sinemet and she noticed some improvement in her tremors although she still has a jaw tremor.  She denies any side effects with the medicine.  She can not remember to take it mid day so as she is interested in a more long-acting version of the medicine.  She feels like Ajovy is not helping with her headaches.    Medication List with Changes/Refills   New Medications    CARBIDOPA-LEVODOPA  MG (SINEMET CR)  MG TBSR    Take 1 tablet by mouth 2 (two) times daily.   Current Medications    CALCIUM CARBONATE (TUMS) 200 MG CALCIUM (500 MG) CHEWABLE TABLET    Take 1 tablet by mouth daily as needed.    CARVEDILOL (COREG) 6.25 MG TABLET    Take 6.25 mg by mouth 2 (two) times daily.    DAPAGLIFLOZIN PROPANEDIOL (FARXIGA) 10 MG TABLET    Take 10 mg by mouth once daily.    DEXLANSOPRAZOLE (DEXILANT) 60 MG CAPSULE    Take 60 mg by mouth Daily.    DOFETILIDE (TIKOSYN) 125 MCG CAP    Take 125 mcg by mouth every 12 (twelve) hours.    DOXEPIN (SINEQUAN) 75 MG CAPSULE    Take 75 mg by mouth every evening.    DULOXETINE (CYMBALTA) 20 MG CAPSULE    Take 20 mg by mouth every evening.    ELIQUIS 5 MG TAB    Take 5 mg by mouth 2 (two) times daily.    ERGOCALCIFEROL 8000 UNITS/ML DROP LIQUID (PEDS)    Take by mouth.    EZETIMIBE (ZETIA) 10 MG TABLET    TAKE 1 TABLET BY MOUTH EVERY DAY    FAMOTIDINE (PEPCID) 40 MG TABLET    Take 40 mg by mouth once daily.    FREMANEZUMAB-VFRM (AJOVY AUTOINJECTOR) 225 MG/1.5  ML AUTOINJECTOR    Inject 225 mg into the skin every 30 days.    ISOMETHEPTEN-CAF-ACETAMINOPHEN 65- MG TAB    Take 1 tablet by mouth daily as needed.    LISINOPRIL 10 MG TABLET    TAKE 1 TABLET BY MOUTH EVERY DAY    MONTELUKAST (SINGULAIR) 10 MG TABLET    Take 1 tablet (10 mg total) by mouth every evening.    OXYCODONE (ROXICODONE) 15 MG TAB    Take 15 mg by mouth every 4 (four) hours as needed.    POTASSIUM CHLORIDE (KLOR-CON 8) 8 MEQ TBSR    Take 8 mEq by mouth as needed.    POVIDONE, PF, (IVIZIA, PF,) 0.5 % DROP    Apply to eye.    ROPINIROLE (REQUIP) 0.25 MG TABLET    Take 0.25 mg by mouth every evening.    SODIUM BICARBONATE 650 MG TABLET    Take 1 tablet by mouth every morning.    TOPAMAX 50 MG TABLET    Take 50 mg by mouth. 50 mg qAM and 100mg qPM    TRAMADOL (ULTRAM) 50 MG TABLET    Take 50 mg by mouth 3 (three) times daily as needed.   Discontinued Medications    CARBIDOPA-LEVODOPA  MG (SINEMET)  MG PER TABLET    0.5 tablet TID for 1 week, then increase to 1 tablet PO TID thereafter        Vitals:    05/07/25 0932   BP: 129/81   Pulse: 64        General: alert and oriented, no acute distress, no audible wheezes, pulse intact, no edema     Cognition and Comprehension  Speech and language intact  Follows commands  Speech fluent  Attention intact  Memory for recent events intact from history taking  Affect pleasant  Fund of knowledge adequate     Resting tremor lips, jaw  hypomimia     Cranial nerves  II. Optic: Visual fields full to confrontation both eyes  III, IV, VI. Oculomotor: Intact, Pupils equal, round and reactive to light, no nystagmus  V. Trigeminal: sensation to light touch normal  VII. No facial asymmetry or facial weakness  VIII. Hearing intact to spoken voice  IX/X. Glossopharyngeal/Vagus: Voice normal, palate rises symmetrically  XI. Axillary: Shoulder shrug normal  XII. Hypoglossal: Intact     Muscle Strength and Tone  Normal upper extremity tone  Normal lower extremity  tone  Normal upper extremity strength  Normal lower extremity strength  Resting tremor LUE>RUE  Bradykinesia in LUE     Coordination and Gait  Finger to nose normal  Gait normal     1. Parkinsonism, unspecified Parkinsonism type  -     carbidopa-levodopa  mg (SINEMET CR)  mg TbSR; Take 1 tablet by mouth 2 (two) times daily.  Dispense: 60 tablet; Refill: 11    2. Migraine without status migrainosus, not intractable, unspecified migraine type       Talk to her [facility] neurologist about vyepti  Sinemet CR